# Patient Record
Sex: MALE | Race: WHITE | URBAN - METROPOLITAN AREA
[De-identification: names, ages, dates, MRNs, and addresses within clinical notes are randomized per-mention and may not be internally consistent; named-entity substitution may affect disease eponyms.]

---

## 2017-01-23 ENCOUNTER — EMERGENCY (EMERGENCY)
Facility: HOSPITAL | Age: 82
LOS: 0 days | Discharge: NURSING, REHAB OR LONG-TERM CARE | End: 2017-01-24
Admitting: EMERGENCY MEDICINE
Payer: MEDICARE

## 2017-01-23 DIAGNOSIS — R73.9 HYPERGLYCEMIA, UNSPECIFIED: ICD-10-CM

## 2017-01-23 DIAGNOSIS — R50.9 FEVER, UNSPECIFIED: ICD-10-CM

## 2017-01-23 DIAGNOSIS — Z79.4 LONG TERM (CURRENT) USE OF INSULIN: ICD-10-CM

## 2017-01-23 DIAGNOSIS — B34.9 VIRAL INFECTION, UNSPECIFIED: ICD-10-CM

## 2017-01-23 DIAGNOSIS — Z79.899 OTHER LONG TERM (CURRENT) DRUG THERAPY: ICD-10-CM

## 2017-01-23 PROCEDURE — 71010: CPT | Mod: 26

## 2017-01-23 PROCEDURE — 99285 EMERGENCY DEPT VISIT HI MDM: CPT

## 2017-01-23 PROCEDURE — 93010 ELECTROCARDIOGRAM REPORT: CPT

## 2017-01-29 LAB
CULTURE RESULTS: SIGNIFICANT CHANGE UP
CULTURE RESULTS: SIGNIFICANT CHANGE UP
SPECIMEN SOURCE: SIGNIFICANT CHANGE UP
SPECIMEN SOURCE: SIGNIFICANT CHANGE UP

## 2018-01-07 ENCOUNTER — INPATIENT (INPATIENT)
Facility: HOSPITAL | Age: 83
LOS: 1 days | Discharge: HOME | End: 2018-01-09
Admitting: INTERNAL MEDICINE
Payer: MEDICARE

## 2018-01-07 VITALS
SYSTOLIC BLOOD PRESSURE: 142 MMHG | WEIGHT: 154.98 LBS | HEART RATE: 63 BPM | RESPIRATION RATE: 18 BRPM | DIASTOLIC BLOOD PRESSURE: 77 MMHG | HEIGHT: 66 IN | OXYGEN SATURATION: 95 % | TEMPERATURE: 98 F

## 2018-01-07 DIAGNOSIS — Z90.89 ACQUIRED ABSENCE OF OTHER ORGANS: Chronic | ICD-10-CM

## 2018-01-07 LAB
ALBUMIN SERPL ELPH-MCNC: 3.7 G/DL — SIGNIFICANT CHANGE UP (ref 3.3–5)
ALP SERPL-CCNC: 90 U/L — SIGNIFICANT CHANGE UP (ref 40–120)
ALT FLD-CCNC: 19 U/L — SIGNIFICANT CHANGE UP (ref 12–78)
ANION GAP SERPL CALC-SCNC: 6 MMOL/L — SIGNIFICANT CHANGE UP (ref 5–17)
APTT BLD: 33.1 SEC — SIGNIFICANT CHANGE UP (ref 27.5–37.4)
AST SERPL-CCNC: 16 U/L — SIGNIFICANT CHANGE UP (ref 15–37)
BASOPHILS # BLD AUTO: 0.1 K/UL — SIGNIFICANT CHANGE UP (ref 0–0.2)
BASOPHILS NFR BLD AUTO: 1 % — SIGNIFICANT CHANGE UP (ref 0–2)
BILIRUB SERPL-MCNC: 0.6 MG/DL — SIGNIFICANT CHANGE UP (ref 0.2–1.2)
BUN SERPL-MCNC: 29 MG/DL — HIGH (ref 7–23)
CALCIUM SERPL-MCNC: 9 MG/DL — SIGNIFICANT CHANGE UP (ref 8.5–10.1)
CHLORIDE SERPL-SCNC: 108 MMOL/L — SIGNIFICANT CHANGE UP (ref 96–108)
CO2 SERPL-SCNC: 29 MMOL/L — SIGNIFICANT CHANGE UP (ref 22–31)
CREAT SERPL-MCNC: 1.22 MG/DL — SIGNIFICANT CHANGE UP (ref 0.5–1.3)
EOSINOPHIL # BLD AUTO: 0.3 K/UL — SIGNIFICANT CHANGE UP (ref 0–0.5)
EOSINOPHIL NFR BLD AUTO: 4.6 % — SIGNIFICANT CHANGE UP (ref 0–6)
GLUCOSE SERPL-MCNC: 155 MG/DL — HIGH (ref 70–99)
HCT VFR BLD CALC: 46.8 % — SIGNIFICANT CHANGE UP (ref 39–50)
HGB BLD-MCNC: 15.3 G/DL — SIGNIFICANT CHANGE UP (ref 13–17)
INR BLD: 1.05 RATIO — SIGNIFICANT CHANGE UP (ref 0.88–1.16)
LYMPHOCYTES # BLD AUTO: 1 K/UL — SIGNIFICANT CHANGE UP (ref 1–3.3)
LYMPHOCYTES # BLD AUTO: 14.6 % — SIGNIFICANT CHANGE UP (ref 13–44)
MCHC RBC-ENTMCNC: 29.3 PG — SIGNIFICANT CHANGE UP (ref 27–34)
MCHC RBC-ENTMCNC: 32.8 GM/DL — SIGNIFICANT CHANGE UP (ref 32–36)
MCV RBC AUTO: 89.4 FL — SIGNIFICANT CHANGE UP (ref 80–100)
MONOCYTES # BLD AUTO: 0.6 K/UL — SIGNIFICANT CHANGE UP (ref 0–0.9)
MONOCYTES NFR BLD AUTO: 9.5 % — SIGNIFICANT CHANGE UP (ref 2–14)
NEUTROPHILS # BLD AUTO: 4.7 K/UL — SIGNIFICANT CHANGE UP (ref 1.8–7.4)
NEUTROPHILS NFR BLD AUTO: 70.3 % — SIGNIFICANT CHANGE UP (ref 43–77)
PLATELET # BLD AUTO: 151 K/UL — SIGNIFICANT CHANGE UP (ref 150–400)
POTASSIUM SERPL-MCNC: 4.1 MMOL/L — SIGNIFICANT CHANGE UP (ref 3.5–5.3)
POTASSIUM SERPL-SCNC: 4.1 MMOL/L — SIGNIFICANT CHANGE UP (ref 3.5–5.3)
PROT SERPL-MCNC: 7.1 GM/DL — SIGNIFICANT CHANGE UP (ref 6–8.3)
PROTHROM AB SERPL-ACNC: 11.4 SEC — SIGNIFICANT CHANGE UP (ref 9.8–12.7)
RBC # BLD: 5.23 M/UL — SIGNIFICANT CHANGE UP (ref 4.2–5.8)
RBC # FLD: 12.6 % — SIGNIFICANT CHANGE UP (ref 10.3–14.5)
SODIUM SERPL-SCNC: 143 MMOL/L — SIGNIFICANT CHANGE UP (ref 135–145)
TROPONIN I SERPL-MCNC: <0.015 NG/ML — SIGNIFICANT CHANGE UP (ref 0.01–0.04)
WBC # BLD: 6.7 K/UL — SIGNIFICANT CHANGE UP (ref 3.8–10.5)
WBC # FLD AUTO: 6.7 K/UL — SIGNIFICANT CHANGE UP (ref 3.8–10.5)

## 2018-01-07 PROCEDURE — 99285 EMERGENCY DEPT VISIT HI MDM: CPT

## 2018-01-07 PROCEDURE — 72125 CT NECK SPINE W/O DYE: CPT | Mod: 26

## 2018-01-07 PROCEDURE — 71045 X-RAY EXAM CHEST 1 VIEW: CPT | Mod: 26

## 2018-01-07 PROCEDURE — 70450 CT HEAD/BRAIN W/O DYE: CPT | Mod: 26

## 2018-01-07 RX ORDER — CITALOPRAM 10 MG/1
10 TABLET, FILM COATED ORAL DAILY
Qty: 0 | Refills: 0 | Status: DISCONTINUED | OUTPATIENT
Start: 2018-01-08 | End: 2018-01-09

## 2018-01-07 RX ORDER — ASPIRIN/CALCIUM CARB/MAGNESIUM 324 MG
81 TABLET ORAL DAILY
Qty: 0 | Refills: 0 | Status: DISCONTINUED | OUTPATIENT
Start: 2018-01-08 | End: 2018-01-09

## 2018-01-07 RX ORDER — DOCUSATE SODIUM 100 MG
100 CAPSULE ORAL THREE TIMES A DAY
Qty: 0 | Refills: 0 | Status: DISCONTINUED | OUTPATIENT
Start: 2018-01-07 | End: 2018-01-09

## 2018-01-07 RX ORDER — ERGOCALCIFEROL 1.25 MG/1
1 CAPSULE ORAL
Qty: 0 | Refills: 0 | COMMUNITY

## 2018-01-07 RX ORDER — PANTOPRAZOLE SODIUM 20 MG/1
40 TABLET, DELAYED RELEASE ORAL
Qty: 0 | Refills: 0 | Status: DISCONTINUED | OUTPATIENT
Start: 2018-01-08 | End: 2018-01-09

## 2018-01-07 RX ORDER — HYDRALAZINE HCL 50 MG
25 TABLET ORAL
Qty: 0 | Refills: 0 | Status: DISCONTINUED | OUTPATIENT
Start: 2018-01-07 | End: 2018-01-09

## 2018-01-07 RX ORDER — HEPARIN SODIUM 5000 [USP'U]/ML
5000 INJECTION INTRAVENOUS; SUBCUTANEOUS EVERY 12 HOURS
Qty: 0 | Refills: 0 | Status: DISCONTINUED | OUTPATIENT
Start: 2018-01-07 | End: 2018-01-09

## 2018-01-07 RX ORDER — ACETAMINOPHEN 500 MG
650 TABLET ORAL EVERY 6 HOURS
Qty: 0 | Refills: 0 | Status: DISCONTINUED | OUTPATIENT
Start: 2018-01-07 | End: 2018-01-09

## 2018-01-07 RX ORDER — INSULIN GLARGINE 100 [IU]/ML
0 INJECTION, SOLUTION SUBCUTANEOUS
Qty: 0 | Refills: 0 | COMMUNITY

## 2018-01-07 RX ORDER — DOCUSATE SODIUM 100 MG
1 CAPSULE ORAL
Qty: 0 | Refills: 0 | COMMUNITY

## 2018-01-07 RX ORDER — SENNA PLUS 8.6 MG/1
2 TABLET ORAL AT BEDTIME
Qty: 0 | Refills: 0 | Status: DISCONTINUED | OUTPATIENT
Start: 2018-01-07 | End: 2018-01-09

## 2018-01-07 RX ORDER — PREGABALIN 225 MG/1
500 CAPSULE ORAL DAILY
Qty: 0 | Refills: 0 | Status: DISCONTINUED | OUTPATIENT
Start: 2018-01-08 | End: 2018-01-09

## 2018-01-07 RX ORDER — SIMVASTATIN 20 MG/1
10 TABLET, FILM COATED ORAL AT BEDTIME
Qty: 0 | Refills: 0 | Status: DISCONTINUED | OUTPATIENT
Start: 2018-01-07 | End: 2018-01-09

## 2018-01-07 RX ORDER — CHOLECALCIFEROL (VITAMIN D3) 125 MCG
1 CAPSULE ORAL
Qty: 0 | Refills: 0 | COMMUNITY

## 2018-01-07 RX ORDER — ONDANSETRON 8 MG/1
4 TABLET, FILM COATED ORAL EVERY 6 HOURS
Qty: 0 | Refills: 0 | Status: DISCONTINUED | OUTPATIENT
Start: 2018-01-07 | End: 2018-01-09

## 2018-01-07 NOTE — H&P ADULT - NSHPPHYSICALEXAM_GEN_ALL_CORE
Vital Signs Last 24 Hrs  T(C): 36.5 (07 Jan 2018 11:51), Max: 36.5 (07 Jan 2018 11:51)  T(F): 97.7 (07 Jan 2018 11:51), Max: 97.7 (07 Jan 2018 11:51)  HR: 63 (07 Jan 2018 11:51) (63 - 63)  BP: 142/77 (07 Jan 2018 11:51) (142/77 - 142/77)  RR: 18 (07 Jan 2018 11:51) (18 - 18)  SpO2: 95% (07 Jan 2018 11:51) (95% - 95%)

## 2018-01-07 NOTE — H&P ADULT - HISTORY OF PRESENT ILLNESS
89 y/o M PMHx significant for IDDM, HTN, hyperlipidemia, depression, dementia, and CKD4 who was BIBA from [facility] for   further evaluation of an unwitnessed fall w/ (+)head trauma and unknown LOC. The patient presented to the ED as a trauma   notification. In the ED the patient was found to have a (+)head laceration w/ hematoma to the left forehead. CT Head did not reveal any acute pathology to note.  The patient denies any associated prodrome of chest pain, palpitations, shortness of breath or dizziness.

## 2018-01-07 NOTE — H&P ADULT - PMH
Dementia  Alzheimer's  Diabetes  Insulin dependent  HTN (hypertension)    Hyperlipidemia CKD (chronic kidney disease), stage IV    Dementia  Alzheimer's  Depression    Diabetes  Insulin dependent  HTN (hypertension)    Hyperlipidemia

## 2018-01-07 NOTE — ED PROVIDER NOTE - OBJECTIVE STATEMENT
Patient is a 89 yo male with pmh HTN, HLD, DM, dementia, on baby aspirin presenting from [facility] s/p unwitnessed fall just prior to arrival.  Has abrasion to forehead.  States he got up from bed and fell forward, is unsure why or how.  Denies precipitating symptoms such as chest pain, sob, or dizziness.  No musculoskeletal pain. Patient is a 89 yo male with pmh HTN, HLD, DM, dementia, on baby aspirin presenting from [facility] s/p unwitnessed fall just prior to arrival.  Has abrasion to forehead.  States he got up from bed and fell forward, is unsure why or how.  Patient found to be on ground by staff at facility- helped up; no prolonged period on the ground; Denies precipitating symptoms such as chest pain, sob, or dizziness.  No musculoskeletal pain. Offers no complaints at this time.

## 2018-01-07 NOTE — H&P ADULT - ASSESSMENT
89 y/o M PMHx significant for IDDM, HTN, hyperlipidemia, depression, dementia, and CKD4 who was BIBA from [facility] for   further evaluation of an unwitnessed fall w/ (+)head trauma and unknown LOC. The patient presented to the ED as a trauma   notification. In the ED the patient was found to have a (+)head laceration w/ hematoma to the left forehead. CT Head did not reveal   any acute pathology to note.  The patient denies any associated prodrome of chest pain, palpitations, shortness of breath or   dizziness. 89 y/o M PMHx significant for IDDM, HTN, hyperlipidemia, depression, dementia, and CKD4 who was BIBA from [facility] for   further evaluation of an unwitnessed fall w/ (+)head trauma and unknown LOC. The patient presented to the ED as a trauma   notification. In the ED the patient was found to have a (+)head laceration w/ hematoma to the left forehead. CT Head did not reveal any acute pathology to note.  The patient denies any associated prodrome of chest pain, palpitations, shortness of breath or dizziness.    1)Falls in the Elderly (unwitnessed); Syncope vs. Pre-syncope   ~admit to Telemetry  ~fall precautions  ~f/u serial Cain/EKGs  ~PT eval    2)HTN  ~cont. Hydralazine 25mg po bid    3)Hyperlipidemia  ~cont. Simvastatin 20mg po qHS    4)Depression  ~cont. Citalopram 10mg po daily    5)IDDM  ~FS qAC  ~cont. Basal/Bolus insulin regimen.    6)Vte ppx  ~cont. Heparin sq    Note: case discussed extensively with the patient at the bedside and his HCP (Caroline Smith [phone]) concerning this admission.

## 2018-01-07 NOTE — ED PROVIDER NOTE - CONSTITUTIONAL, MLM
normal... Well appearing, well nourished, awake, alert and oriented x2, and in no apparent distress.

## 2018-01-07 NOTE — ED ADULT TRIAGE NOTE - CHIEF COMPLAINT QUOTE
Pt BIBA from [facility] for unwitnessed fall. Pt on asa. Left sided hematoma noted to head. Pt awake and alert.

## 2018-01-07 NOTE — ED PROVIDER NOTE - MEDICAL DECISION MAKING DETAILS
89 yo male s/p unwitnessed fall with head injury.  Plan labs, imaging r/o trauma/ICH. 89 yo male s/p unwitnessed fall with head injury.  Plan labs, troponin, imaging r/o trauma/ICH. 89 yo male s/p unwitnessed fall with head injury; patient with dementia- unsure circumstances of fall; CT head/c-spine r/o traumatic etiology; EKG/CXR; labs; UA; re-eval closely

## 2018-01-07 NOTE — ED ADULT NURSE NOTE - OBJECTIVE STATEMENT
Unwitnessed fall at [facility].  Pt confused at baseline (hx Alzheimer's), without complaints on arrival.  Abrasion to L side of head.  VS and cardiac monitoring initiated.  Trauma alert initiated on arrival.

## 2018-01-08 LAB
ALBUMIN SERPL ELPH-MCNC: 3.4 G/DL — SIGNIFICANT CHANGE UP (ref 3.3–5)
ALP SERPL-CCNC: 86 U/L — SIGNIFICANT CHANGE UP (ref 40–120)
ALT FLD-CCNC: 14 U/L — SIGNIFICANT CHANGE UP (ref 12–78)
ANION GAP SERPL CALC-SCNC: 7 MMOL/L — SIGNIFICANT CHANGE UP (ref 5–17)
AST SERPL-CCNC: 17 U/L — SIGNIFICANT CHANGE UP (ref 15–37)
BASOPHILS # BLD AUTO: 0.1 K/UL — SIGNIFICANT CHANGE UP (ref 0–0.2)
BASOPHILS NFR BLD AUTO: 1.2 % — SIGNIFICANT CHANGE UP (ref 0–2)
BILIRUB SERPL-MCNC: 0.7 MG/DL — SIGNIFICANT CHANGE UP (ref 0.2–1.2)
BUN SERPL-MCNC: 24 MG/DL — HIGH (ref 7–23)
CALCIUM SERPL-MCNC: 8.9 MG/DL — SIGNIFICANT CHANGE UP (ref 8.5–10.1)
CHLORIDE SERPL-SCNC: 109 MMOL/L — HIGH (ref 96–108)
CO2 SERPL-SCNC: 27 MMOL/L — SIGNIFICANT CHANGE UP (ref 22–31)
CREAT SERPL-MCNC: 1.05 MG/DL — SIGNIFICANT CHANGE UP (ref 0.5–1.3)
EOSINOPHIL # BLD AUTO: 0.3 K/UL — SIGNIFICANT CHANGE UP (ref 0–0.5)
EOSINOPHIL NFR BLD AUTO: 4.4 % — SIGNIFICANT CHANGE UP (ref 0–6)
GLUCOSE BLDC GLUCOMTR-MCNC: 195 MG/DL — HIGH (ref 70–99)
GLUCOSE BLDC GLUCOMTR-MCNC: 204 MG/DL — HIGH (ref 70–99)
GLUCOSE BLDC GLUCOMTR-MCNC: 300 MG/DL — HIGH (ref 70–99)
GLUCOSE BLDC GLUCOMTR-MCNC: 88 MG/DL — SIGNIFICANT CHANGE UP (ref 70–99)
GLUCOSE SERPL-MCNC: 86 MG/DL — SIGNIFICANT CHANGE UP (ref 70–99)
HBA1C BLD-MCNC: 7.6 % — HIGH (ref 4–5.6)
HCT VFR BLD CALC: 45.5 % — SIGNIFICANT CHANGE UP (ref 39–50)
HGB BLD-MCNC: 15.1 G/DL — SIGNIFICANT CHANGE UP (ref 13–17)
LYMPHOCYTES # BLD AUTO: 1 K/UL — SIGNIFICANT CHANGE UP (ref 1–3.3)
LYMPHOCYTES # BLD AUTO: 13.1 % — SIGNIFICANT CHANGE UP (ref 13–44)
MAGNESIUM SERPL-MCNC: 2 MG/DL — SIGNIFICANT CHANGE UP (ref 1.6–2.6)
MCHC RBC-ENTMCNC: 29.4 PG — SIGNIFICANT CHANGE UP (ref 27–34)
MCHC RBC-ENTMCNC: 33.2 GM/DL — SIGNIFICANT CHANGE UP (ref 32–36)
MCV RBC AUTO: 88.6 FL — SIGNIFICANT CHANGE UP (ref 80–100)
MONOCYTES # BLD AUTO: 0.8 K/UL — SIGNIFICANT CHANGE UP (ref 0–0.9)
MONOCYTES NFR BLD AUTO: 10.2 % — SIGNIFICANT CHANGE UP (ref 2–14)
NEUTROPHILS # BLD AUTO: 5.6 K/UL — SIGNIFICANT CHANGE UP (ref 1.8–7.4)
NEUTROPHILS NFR BLD AUTO: 71.1 % — SIGNIFICANT CHANGE UP (ref 43–77)
PLATELET # BLD AUTO: 160 K/UL — SIGNIFICANT CHANGE UP (ref 150–400)
POTASSIUM SERPL-MCNC: 3.5 MMOL/L — SIGNIFICANT CHANGE UP (ref 3.5–5.3)
POTASSIUM SERPL-SCNC: 3.5 MMOL/L — SIGNIFICANT CHANGE UP (ref 3.5–5.3)
PROT SERPL-MCNC: 6.6 GM/DL — SIGNIFICANT CHANGE UP (ref 6–8.3)
RBC # BLD: 5.14 M/UL — SIGNIFICANT CHANGE UP (ref 4.2–5.8)
RBC # FLD: 12.3 % — SIGNIFICANT CHANGE UP (ref 10.3–14.5)
SODIUM SERPL-SCNC: 143 MMOL/L — SIGNIFICANT CHANGE UP (ref 135–145)
TROPONIN I SERPL-MCNC: <0.015 NG/ML — SIGNIFICANT CHANGE UP (ref 0.01–0.04)
WBC # BLD: 7.8 K/UL — SIGNIFICANT CHANGE UP (ref 3.8–10.5)
WBC # FLD AUTO: 7.8 K/UL — SIGNIFICANT CHANGE UP (ref 3.8–10.5)

## 2018-01-08 RX ORDER — HALOPERIDOL DECANOATE 100 MG/ML
0.5 INJECTION INTRAMUSCULAR
Qty: 0 | Refills: 0 | Status: DISCONTINUED | OUTPATIENT
Start: 2018-01-08 | End: 2018-01-09

## 2018-01-08 RX ORDER — DEXTROSE 50 % IN WATER 50 %
25 SYRINGE (ML) INTRAVENOUS ONCE
Qty: 0 | Refills: 0 | Status: DISCONTINUED | OUTPATIENT
Start: 2018-01-08 | End: 2018-01-09

## 2018-01-08 RX ORDER — SODIUM CHLORIDE 9 MG/ML
1000 INJECTION, SOLUTION INTRAVENOUS
Qty: 0 | Refills: 0 | Status: DISCONTINUED | OUTPATIENT
Start: 2018-01-08 | End: 2018-01-09

## 2018-01-08 RX ORDER — DEXTROSE 50 % IN WATER 50 %
1 SYRINGE (ML) INTRAVENOUS ONCE
Qty: 0 | Refills: 0 | Status: DISCONTINUED | OUTPATIENT
Start: 2018-01-08 | End: 2018-01-09

## 2018-01-08 RX ORDER — GLUCAGON INJECTION, SOLUTION 0.5 MG/.1ML
1 INJECTION, SOLUTION SUBCUTANEOUS ONCE
Qty: 0 | Refills: 0 | Status: DISCONTINUED | OUTPATIENT
Start: 2018-01-08 | End: 2018-01-09

## 2018-01-08 RX ORDER — INSULIN LISPRO 100/ML
VIAL (ML) SUBCUTANEOUS
Qty: 0 | Refills: 0 | Status: DISCONTINUED | OUTPATIENT
Start: 2018-01-08 | End: 2018-01-09

## 2018-01-08 RX ORDER — INSULIN GLARGINE 100 [IU]/ML
12 INJECTION, SOLUTION SUBCUTANEOUS AT BEDTIME
Qty: 0 | Refills: 0 | Status: DISCONTINUED | OUTPATIENT
Start: 2018-01-08 | End: 2018-01-09

## 2018-01-08 RX ORDER — DEXTROSE 50 % IN WATER 50 %
12.5 SYRINGE (ML) INTRAVENOUS ONCE
Qty: 0 | Refills: 0 | Status: DISCONTINUED | OUTPATIENT
Start: 2018-01-08 | End: 2018-01-09

## 2018-01-08 RX ORDER — INSULIN GLARGINE 100 [IU]/ML
8 INJECTION, SOLUTION SUBCUTANEOUS EVERY MORNING
Qty: 0 | Refills: 0 | Status: DISCONTINUED | OUTPATIENT
Start: 2018-01-08 | End: 2018-01-08

## 2018-01-08 RX ADMIN — INSULIN GLARGINE 12 UNIT(S): 100 INJECTION, SOLUTION SUBCUTANEOUS at 23:23

## 2018-01-08 RX ADMIN — HEPARIN SODIUM 5000 UNIT(S): 5000 INJECTION INTRAVENOUS; SUBCUTANEOUS at 18:19

## 2018-01-08 RX ADMIN — Medication 25 MILLIGRAM(S): at 06:23

## 2018-01-08 RX ADMIN — PREGABALIN 500 MICROGRAM(S): 225 CAPSULE ORAL at 12:17

## 2018-01-08 RX ADMIN — SIMVASTATIN 10 MILLIGRAM(S): 20 TABLET, FILM COATED ORAL at 23:16

## 2018-01-08 RX ADMIN — PANTOPRAZOLE SODIUM 40 MILLIGRAM(S): 20 TABLET, DELAYED RELEASE ORAL at 06:23

## 2018-01-08 RX ADMIN — HEPARIN SODIUM 5000 UNIT(S): 5000 INJECTION INTRAVENOUS; SUBCUTANEOUS at 06:22

## 2018-01-08 RX ADMIN — Medication 1 TABLET(S): at 12:17

## 2018-01-08 RX ADMIN — Medication 81 MILLIGRAM(S): at 12:18

## 2018-01-08 RX ADMIN — Medication 3: at 18:27

## 2018-01-08 RX ADMIN — CITALOPRAM 10 MILLIGRAM(S): 10 TABLET, FILM COATED ORAL at 12:18

## 2018-01-08 RX ADMIN — Medication 1: at 12:18

## 2018-01-08 RX ADMIN — Medication 25 MILLIGRAM(S): at 18:19

## 2018-01-08 NOTE — PROGRESS NOTE ADULT - SUBJECTIVE AND OBJECTIVE BOX
89 y/o M PMHx significant for IDDM, HTN, hyperlipidemia, depression, dementia, and CKD4 who was BIBA from [facility] for   further evaluation of an unwitnessed fall w/ (+)head trauma and unknown LOC. The patient presented to the ED as a trauma   notification. In the ED the patient was found to have a (+)head laceration w/ hematoma to the left forehead. CT Head did not reveal any acute pathology to note.  The patient denies any associated prodrome of chest pain, palpitations, shortness of breath or dizziness.      1/8/18 - Pt. seen and examined, confused. NAD.           Physical Exam:   Vital Signs Last 24 Hrs T(C): 36.2 (08 Jan 2018 09:36), Max: 36.5 (07 Jan 2018 11:51) T(F): 97.2 (08 Jan 2018 09:36), Max: 97.7 (07 Jan 2018 11:51) HR: 66 (08 Jan 2018 09:36) (55 - 66) BP: 129/52 (08 Jan 2018 09:36) (127/91 - 161/62) BP(mean): -- RR: 17 (08 Jan 2018 09:36) (16 - 18) SpO2: 98% (08 Jan 2018 09:36) (95% - 98%)    PHYSICAL EXAM:  GEN: lying in bed, NAD HEENT:   NC/AT CV:  +S1, +S2, RRR RESP:  CTA B/L GI:  abdomen soft, non-tender, non-distended, normoactive BS MSK:   normal muscle tone EXT:  no edema NEURO:  alert, confused SKIN:  no rashes  	      Laboratory:   All labs reviewed.                          15.1   7.8   )-----------( 160      ( 08 Jan 2018 06:05 )             45.5         01-08    143  |  109<H>  |  24<H>  ----------------------------<  86  3.5   |  27  |  1.05    Ca    8.9      08 Jan 2018 06:05  Mg     2.0     01-08    TPro  6.6  /  Alb  3.4  /  TBili  0.7  /  DBili  x   /  AST  17  /  ALT  14  /  AlkPhos  86  01-08        PT/INR - ( 07 Jan 2018 11:58 )   PT: 11.4 sec;   INR: 1.05 ratio         PTT - ( 07 Jan 2018 11:58 )  PTT:33.1 sec            MEDICATIONS  (STANDING):  aspirin enteric coated 81 milliGRAM(s) Oral daily  citalopram 10 milliGRAM(s) Oral daily  cyanocobalamin 500 MICROGram(s) Oral daily  dextrose 5%. 1000 milliLiter(s) (50 mL/Hr) IV Continuous <Continuous>  dextrose 50% Injectable 12.5 Gram(s) IV Push once  dextrose 50% Injectable 25 Gram(s) IV Push once  dextrose 50% Injectable 25 Gram(s) IV Push once  heparin  Injectable 5000 Unit(s) SubCutaneous every 12 hours  hydrALAZINE 25 milliGRAM(s) Oral two times a day  insulin glargine Injectable (LANTUS) 8 Unit(s) SubCutaneous every morning  insulin lispro (HumaLOG) corrective regimen sliding scale   SubCutaneous three times a day before meals  multivitamin 1 Tablet(s) Oral daily  pantoprazole    Tablet 40 milliGRAM(s) Oral before breakfast  simvastatin 10 milliGRAM(s) Oral at bedtime    MEDICATIONS  (PRN):  acetaminophen   Tablet 650 milliGRAM(s) Oral every 6 hours PRN For Temp greater than 38 C (100.4 F)  acetaminophen   Tablet. 650 milliGRAM(s) Oral every 6 hours PRN Mild Pain (1 - 3)  dextrose Gel 1 Dose(s) Oral once PRN Blood Glucose LESS THAN 70 milliGRAM(s)/deciliter  docusate sodium 100 milliGRAM(s) Oral three times a day PRN Constipation  glucagon  Injectable 1 milliGRAM(s) IntraMuscular once PRN Glucose LESS THAN 70 milligrams/deciliter  ondansetron Injectable 4 milliGRAM(s) IV Push every 6 hours PRN Nausea  senna 2 Tablet(s) Oral at bedtime PRN Constipation              Assessment and Plan:     89 y/o M PMHx as above admitted with:      # Fall  - r/o syncope, monitor on tele  - fall precautions  - PT consult  - trop neg x 2  - check orthostatics    # HTN  - cont. Hydralazine 25mg po bid    # Hyperlipidemia  - cont. Simvastatin 10mg po qHS    # Depression  - cont. Citalopram 10mg po daily    # IDDM  - FS qAC  - c/w Lantus 8 units SQ, (patient takes 20 units at home- ?causing hypoglycemia and increase risk of falls)  - HgbA1c 7.6      # Vte ppx  - cont. Heparin sq    # Dispo plan  - monitor sugars, check PT eval  - case discussed extensively with HCP (Caroline Smith [phone]) concerning this admission 89 y/o M PMHx significant for IDDM, HTN, hyperlipidemia, depression, dementia, and CKD4 who was BIBA from [facility] for   further evaluation of an unwitnessed fall w/ (+)head trauma and unknown LOC. The patient presented to the ED as a trauma   notification. In the ED the patient was found to have a (+)head laceration w/ hematoma to the left forehead. CT Head did not reveal any acute pathology to note.  The patient denies any associated prodrome of chest pain, palpitations, shortness of breath or dizziness.      1/8/18 - Pt. seen and examined, confused. NAD.           Physical Exam:   Vital Signs Last 24 Hrs T(C): 36.2 (08 Jan 2018 09:36), Max: 36.5 (07 Jan 2018 11:51) T(F): 97.2 (08 Jan 2018 09:36), Max: 97.7 (07 Jan 2018 11:51) HR: 66 (08 Jan 2018 09:36) (55 - 66) BP: 129/52 (08 Jan 2018 09:36) (127/91 - 161/62) BP(mean): -- RR: 17 (08 Jan 2018 09:36) (16 - 18) SpO2: 98% (08 Jan 2018 09:36) (95% - 98%)    PHYSICAL EXAM:  GEN: lying in bed, NAD HEENT:   NC/AT CV:  +S1, +S2, RRR RESP:  CTA B/L GI:  abdomen soft, non-tender, non-distended, normoactive BS MSK:   normal muscle tone EXT:  no edema NEURO:  alert, confused SKIN:  no rashes  	      Laboratory:   All labs reviewed.                          15.1   7.8   )-----------( 160      ( 08 Jan 2018 06:05 )             45.5         01-08    143  |  109<H>  |  24<H>  ----------------------------<  86  3.5   |  27  |  1.05    Ca    8.9      08 Jan 2018 06:05  Mg     2.0     01-08    TPro  6.6  /  Alb  3.4  /  TBili  0.7  /  DBili  x   /  AST  17  /  ALT  14  /  AlkPhos  86  01-08        PT/INR - ( 07 Jan 2018 11:58 )   PT: 11.4 sec;   INR: 1.05 ratio         PTT - ( 07 Jan 2018 11:58 )  PTT:33.1 sec            MEDICATIONS  (STANDING):  aspirin enteric coated 81 milliGRAM(s) Oral daily  citalopram 10 milliGRAM(s) Oral daily  cyanocobalamin 500 MICROGram(s) Oral daily  dextrose 5%. 1000 milliLiter(s) (50 mL/Hr) IV Continuous <Continuous>  dextrose 50% Injectable 12.5 Gram(s) IV Push once  dextrose 50% Injectable 25 Gram(s) IV Push once  dextrose 50% Injectable 25 Gram(s) IV Push once  heparin  Injectable 5000 Unit(s) SubCutaneous every 12 hours  hydrALAZINE 25 milliGRAM(s) Oral two times a day  insulin glargine Injectable (LANTUS) 8 Unit(s) SubCutaneous every morning  insulin lispro (HumaLOG) corrective regimen sliding scale   SubCutaneous three times a day before meals  multivitamin 1 Tablet(s) Oral daily  pantoprazole    Tablet 40 milliGRAM(s) Oral before breakfast  simvastatin 10 milliGRAM(s) Oral at bedtime    MEDICATIONS  (PRN):  acetaminophen   Tablet 650 milliGRAM(s) Oral every 6 hours PRN For Temp greater than 38 C (100.4 F)  acetaminophen   Tablet. 650 milliGRAM(s) Oral every 6 hours PRN Mild Pain (1 - 3)  dextrose Gel 1 Dose(s) Oral once PRN Blood Glucose LESS THAN 70 milliGRAM(s)/deciliter  docusate sodium 100 milliGRAM(s) Oral three times a day PRN Constipation  glucagon  Injectable 1 milliGRAM(s) IntraMuscular once PRN Glucose LESS THAN 70 milligrams/deciliter  ondansetron Injectable 4 milliGRAM(s) IV Push every 6 hours PRN Nausea  senna 2 Tablet(s) Oral at bedtime PRN Constipation              Assessment and Plan:     89 y/o M PMHx as above admitted with:      # Fall  - r/o syncope, monitor on tele  - fall precautions  - PT consult  - trop neg x 2  - check orthostatics  - CT scan head neg  - CT spine - IMPRESSION:   No vertebral fracture is recognized.   Multilevel degenerative changes of the cervical spine are present.    # HTN  - cont. Hydralazine 25mg po bid    # Hyperlipidemia  - cont. Simvastatin 10mg po qHS    # Depression  - cont. Citalopram 10mg po daily    # IDDM  - FS qAC  - c/w Lantus 8 units SQ, (patient takes 20 units at home- ?causing hypoglycemia and increase risk of falls)  - HgbA1c 7.6      # Vte ppx  - cont. Heparin sq    # Dispo plan  - monitor sugars, check PT eval  - case discussed extensively with HCP (Caroline Smith [phone]) concerning this admission CC: FALL     HPI: 89 y/o M PMHx significant for IDDM, HTN, hyperlipidemia, depression, dementia, and CKD4 who was BIBA from [facility] for   further evaluation of an unwitnessed fall w/ (+)head trauma and unknown LOC. The patient presented to the ED as a trauma   notification. In the ED the patient was found to have a (+)head laceration w/ hematoma to the left forehead. CT Head did not reveal any acute pathology to note.  The patient denies any associated prodrome of chest pain, palpitations, shortness of breath or dizziness.      1/8/18 - Pt. seen and examined, confused. NAD.           Physical Exam:   Vital Signs Last 24 Hrs T(C): 36.2 (08 Jan 2018 09:36), Max: 36.5 (07 Jan 2018 11:51) T(F): 97.2 (08 Jan 2018 09:36), Max: 97.7 (07 Jan 2018 11:51) HR: 66 (08 Jan 2018 09:36) (55 - 66) BP: 129/52 (08 Jan 2018 09:36) (127/91 - 161/62) BP(mean): -- RR: 17 (08 Jan 2018 09:36) (16 - 18) SpO2: 98% (08 Jan 2018 09:36) (95% - 98%)    PHYSICAL EXAM:  GEN: lying in bed, NAD HEENT:   NC/AT CV:  +S1, +S2, RRR RESP:  CTA B/L GI:  abdomen soft, non-tender, non-distended, normoactive BS MSK:   normal muscle tone EXT:  no edema NEURO:  alert, confused SKIN:  no rashes  	      Laboratory:   All labs reviewed.                          15.1   7.8   )-----------( 160      ( 08 Jan 2018 06:05 )             45.5         01-08    143  |  109<H>  |  24<H>  ----------------------------<  86  3.5   |  27  |  1.05    Ca    8.9      08 Jan 2018 06:05  Mg     2.0     01-08    TPro  6.6  /  Alb  3.4  /  TBili  0.7  /  DBili  x   /  AST  17  /  ALT  14  /  AlkPhos  86  01-08  PT/INR - ( 07 Jan 2018 11:58 )   PT: 11.4 sec;   INR: 1.05 ratio      PTT - ( 07 Jan 2018 11:58 )  PTT:33.1 sec      MEDICATIONS  (STANDING):  aspirin enteric coated 81 milliGRAM(s) Oral daily  citalopram 10 milliGRAM(s) Oral daily  cyanocobalamin 500 MICROGram(s) Oral daily  dextrose 5%. 1000 milliLiter(s) (50 mL/Hr) IV Continuous <Continuous>  dextrose 50% Injectable 12.5 Gram(s) IV Push once  dextrose 50% Injectable 25 Gram(s) IV Push once  dextrose 50% Injectable 25 Gram(s) IV Push once  heparin  Injectable 5000 Unit(s) SubCutaneous every 12 hours  hydrALAZINE 25 milliGRAM(s) Oral two times a day  insulin glargine Injectable (LANTUS) 8 Unit(s) SubCutaneous every morning  insulin lispro (HumaLOG) corrective regimen sliding scale   SubCutaneous three times a day before meals  multivitamin 1 Tablet(s) Oral daily  pantoprazole    Tablet 40 milliGRAM(s) Oral before breakfast  simvastatin 10 milliGRAM(s) Oral at bedtime    Assessment and Plan:     89 y/o M PMHx as above admitted with:      # Fall  - r/o syncope, monitor on tele  - fall precautions  - PT consult  - trop neg x 2  - check orthostatics  - CT scan head neg  - CT spine - IMPRESSION:   No vertebral fracture is recognized.   Multilevel degenerative changes of the cervical spine are present.    # HTN  - cont. Hydralazine 25mg po bid    # Hyperlipidemia  - cont. Simvastatin 10mg po qHS    # Depression  - cont. Citalopram 10mg po daily    # IDDM  - FS qAC  - c/w Lantus 8 units SQ, (patient takes 20 units at home- ?causing hypoglycemia and increase risk of falls)  - HgbA1c 7.6      # Vte ppx  - cont. Heparin sq    # Dispo plan  - monitor sugars, check PT eval  - case discussed extensively with HCP (Caroline Smith [phone]) concerning this admission

## 2018-01-08 NOTE — PROGRESS NOTE ADULT - ATTENDING COMMENTS
Pt. seen and examined with ADALI Conti. Agree with assessment and plan as above. Changes made where appropriate. Pt. seen and examined with ADALI Conti. Agree with assessment and plan as above. Changes made where appropriate.    Lengthy discussion with daughter Caroline (HCP) regarding patient. HE sustained a mechanical fall at SNF ([facility]), tripped over his shoes, no LOC.     Plan:   - no signs of infection, no acute bleeding, negative head CT and stable electrolytes.   - plan for PT eval and dc back to SNF tomorrow.

## 2018-01-09 ENCOUNTER — TRANSCRIPTION ENCOUNTER (OUTPATIENT)
Age: 83
End: 2018-01-09

## 2018-01-09 VITALS
HEART RATE: 61 BPM | DIASTOLIC BLOOD PRESSURE: 45 MMHG | OXYGEN SATURATION: 93 % | TEMPERATURE: 98 F | RESPIRATION RATE: 18 BRPM | SYSTOLIC BLOOD PRESSURE: 127 MMHG

## 2018-01-09 LAB
GLUCOSE BLDC GLUCOMTR-MCNC: 139 MG/DL — HIGH (ref 70–99)
GLUCOSE BLDC GLUCOMTR-MCNC: 207 MG/DL — HIGH (ref 70–99)

## 2018-01-09 RX ORDER — HALOPERIDOL DECANOATE 100 MG/ML
0.5 INJECTION INTRAMUSCULAR ONCE
Qty: 0 | Refills: 0 | Status: COMPLETED | OUTPATIENT
Start: 2018-01-09 | End: 2018-01-09

## 2018-01-09 RX ADMIN — Medication: at 12:19

## 2018-01-09 RX ADMIN — HEPARIN SODIUM 5000 UNIT(S): 5000 INJECTION INTRAVENOUS; SUBCUTANEOUS at 04:48

## 2018-01-09 RX ADMIN — Medication 81 MILLIGRAM(S): at 12:19

## 2018-01-09 RX ADMIN — PANTOPRAZOLE SODIUM 40 MILLIGRAM(S): 20 TABLET, DELAYED RELEASE ORAL at 04:48

## 2018-01-09 RX ADMIN — CITALOPRAM 10 MILLIGRAM(S): 10 TABLET, FILM COATED ORAL at 12:20

## 2018-01-09 RX ADMIN — Medication 1 TABLET(S): at 12:20

## 2018-01-09 RX ADMIN — HALOPERIDOL DECANOATE 0.5 MILLIGRAM(S): 100 INJECTION INTRAMUSCULAR at 04:47

## 2018-01-09 RX ADMIN — PREGABALIN 500 MICROGRAM(S): 225 CAPSULE ORAL at 12:20

## 2018-01-09 RX ADMIN — Medication 25 MILLIGRAM(S): at 04:48

## 2018-01-09 NOTE — PHYSICAL THERAPY INITIAL EVALUATION ADULT - GENERAL OBSERVATIONS, REHAB EVAL
Pt found sitting in recliner by nursing station with seat alarm and chair alarm attached. Pt with tele monitor. PAINAD: 0/10.

## 2018-01-09 NOTE — DISCHARGE NOTE ADULT - PLAN OF CARE
to return to [facility] - maintain safety Continue home medication regimen - HgbA1C = 7.6 - HgbA1C = 7.6, good diabetic control

## 2018-01-09 NOTE — DISCHARGE NOTE ADULT - CARE PLAN
Principal Discharge DX:	Fall  Goal:	to return to [facility]  Instructions for follow-up, activity and diet:	- maintain safety  Secondary Diagnosis:	Diabetes  Goal:	Continue home medication regimen  Instructions for follow-up, activity and diet:	- HgbA1C = 7.6 Principal Discharge DX:	Fall  Goal:	to return to [facility]  Instructions for follow-up, activity and diet:	- maintain safety  Secondary Diagnosis:	Diabetes  Goal:	Continue home medication regimen  Instructions for follow-up, activity and diet:	- HgbA1C = 7.6, good diabetic control

## 2018-01-09 NOTE — DISCHARGE NOTE ADULT - MEDICATION SUMMARY - MEDICATIONS TO TAKE
I will START or STAY ON the medications listed below when I get home from the hospital:    aspirin 81 mg oral tablet  -- 1 tab(s) by mouth once a day  -- Indication: For For your heart    acetaminophen 650 mg oral tablet  -- 1 tab(s) by mouth every 6 hours, As Needed  -- Indication: For tylenol as needed    citalopram 10 mg oral tablet  -- 1 tab(s) by mouth once a day  -- Indication: For antidepressant    HumuLIN R  -- Indication: For For diabetes    Lantus  -- 20 unit(s) subcutaneous once a day  -- Indication: For For diabetes    simvastatin 10 mg oral tablet  -- 1 tab(s) by mouth once a day (at bedtime)  -- Indication: For For your cholesterol    senna oral tablet  -- 1 tab(s) by mouth once a day  -- Indication: For Stool softner    docusate sodium 100 mg oral capsule  -- 1 cap(s) by mouth once a day (at bedtime)  -- Indication: For Stool softner    omeprazole 20 mg oral delayed release capsule  -- 1 cap(s) by mouth once a day  -- Indication: For For your stomach    hydrALAZINE 25 mg oral tablet  -- 1 tab(s) by mouth 2 times a day  -- Indication: For For your heart    Multiple Vitamins oral tablet  -- 1 tab(s) by mouth once a day  -- Indication: For vitamin    Vitamin D2 50,000 intl units (1.25 mg) oral capsule  -- 1 cap(s) by mouth once a week  -- Indication: For vitamin    Vitamin B12 500 mcg oral tablet  -- 1 tab(s) by mouth once a day  -- Indication: For vitamin

## 2018-01-09 NOTE — DISCHARGE NOTE ADULT - HOSPITAL COURSE
HPI from ED: 89 y/o M with PMHx significant for IDDM, HTN, hyperlipidemia, depression, dementia, and CKD4 who was BIBA from [facility] for further evaluation of an unwitnessed fall w/ (+)head trauma and unknown LOC. The patient presented to the ED as a trauma notification. In the ED the patient was found to have a (+)head laceration w/ hematoma to the left forehead. CT Head did not reveal any acute pathology to note.  T       Physical Exam:   Vital Signs Last 24 Hrs T(C): 36.7 (09 Jan 2018 05:02), Max: 37.2 (08 Jan 2018 17:06) T(F): 98 (09 Jan 2018 05:02), Max: 98.9 (08 Jan 2018 17:06) HR: 61 (09 Jan 2018 05:02) (61 - 71) BP: 127/45 (09 Jan 2018 05:02) (107/34 - 129/52) BP(mean): -- RR: 18 (09 Jan 2018 05:02) (17 - 18) SpO2: 93% (09 Jan 2018 05:02) (93% - 98%)    PHYSICAL EXAM:  GEN: sitting in the chair, NAD, confused at baseline HEENT:   NC/AT CV:  +S1, +S2, RRR RESP:  CTA B/L GI:  abdomen soft, non-tender, non-distended, normoactive BS MSK:   normal muscle tone EXT:  no edema NEURO:  alert, confused SKIN:  no rashes  	      Laboratory:   All labs reviewed.    HOSPITAL COURSE BY PROBLEM:      # Fall  - no events on tele  - fall precautions  - PT consult -  - trop neg x 2  - orthostatics negative  - CT scan head neg  - CT spine - IMPRESSION:   No vertebral fracture is recognized.   Multilevel degenerative changes of the cervical spine are present.    # HTN  - cont. Hydralazine 25mg po bid    # Hyperlipidemia  - cont. Simvastatin 10mg po qHS    # Depression  - cont. Citalopram 10mg po daily    # IDDM  - FS qAC  - continue pt home dose of Lantus 20 units on discharge (tried to decrease Lantus dose in hospital and sugars were elevated so will maintain home dose)  - HgbA1c 7.6      Discussed with HCP (Caroline Smith [phone]). CC: fall    HPI from ED: 89 y/o M with PMHx significant for IDDM, HTN, hyperlipidemia, depression, dementia, and CKD4 who was BIBA from [facility] for further evaluation of an unwitnessed fall w/ (+)head trauma and unknown LOC. The patient presented to the ED as a trauma notification. In the ED the patient was found to have a (+)head laceration w/ hematoma to the left forehead. CT Head did not reveal any acute pathology to note.      1/9/18: Seen sitting in gerichair in hallway, no complaints. Pleasantly confused. Afebrile.    ROS; neg unless stated above.        Physical Exam:   Vital Signs Last 24 Hrs T(C): 36.7 (09 Jan 2018 05:02), Max: 37.2 (08 Jan 2018 17:06) T(F): 98 (09 Jan 2018 05:02), Max: 98.9 (08 Jan 2018 17:06) HR: 61 (09 Jan 2018 05:02) (61 - 71) BP: 127/45 (09 Jan 2018 05:02) (107/34 - 129/52) BP(mean): -- RR: 18 (09 Jan 2018 05:02) (17 - 18) SpO2: 93% (09 Jan 2018 05:02) (93% - 98%)  PHYSICAL EXAM: GEN: sitting in the chair, NAD, confused at baseline HEENT:   NC/AT CV:  +S1, +S2, RRR RESP:  CTA B/L GI:  abdomen soft, non-tender, non-distended, normoactive BS MSK:   normal muscle tone EXT:  no edema NEURO:  alert, confused SKIN:  no rashes  	      Laboratory:   All labs reviewed.    HOSPITAL COURSE BY PROBLEM:    # Fall  - mechanical in nature, tripped over shoes per daughter Caroline.   - no events on tele  - fall precautions  - PT consult -  - trop neg x 2  - orthostatics negative  - CT scan head neg  - CT spine - IMPRESSION:   No vertebral fracture is recognized.   Multilevel degenerative changes of the cervical spine are present.    # HTN - stable BP  - cont. Hydralazine 25mg po bid    # Hyperlipidemia  - cont. Simvastatin 10mg po qHS    # Depression  - cont. Citalopram 10mg po daily    # IDDM  - FS qAC  - continue pt home dose of Lantus 20 units on discharge (tried to decrease Lantus dose in hospital and sugars were elevated so will maintain home dose)  - HgbA1c 7.6      Discussed with HCP (Caroline Smith [phone]).    DC today. Total time > 35 mins. CC: fall    HPI from ED: 89 y/o M with PMHx significant for IDDM, HTN, hyperlipidemia, depression, dementia, and CKD4 who was BIBA from [facility] for further evaluation of an unwitnessed fall w/ (+)head trauma and unknown LOC. The patient presented to the ED as a trauma notification. In the ED the patient was found to have a (+)head laceration w/ hematoma to the left forehead. CT Head did not reveal any acute pathology to note.      1/9/18: Seen sitting in gerichair in [place], no complaints. Pleasantly confused. Afebrile.    ROS; neg unless stated above.        Physical Exam:   Vital Signs Last 24 Hrs T(C): 36.7 (09 Jan 2018 05:02), Max: 37.2 (08 Jan 2018 17:06) T(F): 98 (09 Jan 2018 05:02), Max: 98.9 (08 Jan 2018 17:06) HR: 61 (09 Jan 2018 05:02) (61 - 71) BP: 127/45 (09 Jan 2018 05:02) (107/34 - 129/52) BP(mean): -- RR: 18 (09 Jan 2018 05:02) (17 - 18) SpO2: 93% (09 Jan 2018 05:02) (93% - 98%)  PHYSICAL EXAM: GEN: sitting in the chair, NAD, confused at baseline HEENT:   NC/AT CV:  +S1, +S2, RRR RESP:  CTA B/L GI:  abdomen soft, non-tender, non-distended, normoactive BS MSK:   normal muscle tone EXT:  no edema NEURO:  alert, confused SKIN:  no rashes  	      Laboratory:   All labs reviewed.    HOSPITAL COURSE BY PROBLEM:    # Fall  - mechanical in nature, tripped over shoes per daughter Caroline.   - no events on tele  - fall precautions  - PT consult apprecaited  - trop neg x 2  - orthostatics negative  - CT scan head neg  - CT spine - IMPRESSION:   No vertebral fracture is recognized.   Multilevel degenerative changes of the cervical spine are present.    # HTN - stable BP  - cont. Hydralazine 25mg po bid    # Hyperlipidemia  - cont. Simvastatin 10mg po qHS    # Depression  - cont. Citalopram 10mg po daily    # IDDM  - FS qAC  - continue pt home dose of Lantus 20 units on discharge (tried to decrease Lantus dose in hospital and sugars were elevated so will maintain home dose)  - HgbA1c 7.6      Discussed with HCP (Caroline Smith [phone]).    [facility] today to [facility] Total time > 35 mins. CC: fall    HPI from ED: 89 y/o M with PMHx significant for IDDM, HTN, hyperlipidemia, depression, dementia, and CKD4 who was BIBA from [facility] for further evaluation of an unwitnessed fall w/ (+)head trauma and unknown LOC. The patient presented to the ED as a trauma notification. In the ED the patient was found to have a (+)head laceration w/ hematoma to the left forehead. CT Head did not reveal any acute pathology to note.      1/9/18: Seen sitting in gerichair in [place], no complaints. Pleasantly confused. Afebrile.    ROS; neg unless stated above.        Physical Exam:   Vital Signs Last 24 Hrs T(C): 36.7 (09 Jan 2018 05:02), Max: 37.2 (08 Jan 2018 17:06) T(F): 98 (09 Jan 2018 05:02), Max: 98.9 (08 Jan 2018 17:06) HR: 61 (09 Jan 2018 05:02) (61 - 71) BP: 127/45 (09 Jan 2018 05:02) (107/34 - 129/52) BP(mean): -- RR: 18 (09 Jan 2018 05:02) (17 - 18) SpO2: 93% (09 Jan 2018 05:02) (93% - 98%)  PHYSICAL EXAM: GEN: sitting in the chair, NAD, confused at baseline HEENT:   NC/AT CV:  +S1, +S2, RRR RESP:  CTA B/L GI:  abdomen soft, non-tender, non-distended, normoactive BS MSK:   normal muscle tone EXT:  no edema NEURO:  alert, confused SKIN:  no rashes  	      Laboratory:   All labs reviewed.    HOSPITAL COURSE BY PROBLEM:    # Fall  - mechanical in nature, tripped over shoes per daughter Caroline.   - no events on tele  - fall precautions  - PT consult appreciated - okay to return to [facility]  - trop neg x 2  - orthostatics negative  - CT scan head neg  - CT spine - IMPRESSION:   No vertebral fracture is recognized.   Multilevel degenerative changes of the cervical spine are present.    # HTN - stable BP  - cont. Hydralazine 25mg po bid    # Hyperlipidemia  - cont. Simvastatin 10mg po qHS    # Depression  - cont. Citalopram 10mg po daily    # IDDM  - FS qAC  - continue pt home dose of Lantus 20 units on discharge (tried to decrease Lantus dose in hospital and sugars were elevated so will maintain home dose)  - HgbA1c 7.6      Discussed with HCP (Caroline Smith [phone]).    DC today to [facility] Total time > 35 mins.

## 2018-01-09 NOTE — DISCHARGE NOTE ADULT - PATIENT PORTAL LINK FT
“You can access the Follow[employer] Patient Portal, offered by [facility], by registering with the following website: [email or web address]”

## 2018-01-09 NOTE — PHYSICAL THERAPY INITIAL EVALUATION ADULT - PERTINENT HX OF CURRENT PROBLEM, REHAB EVAL
Pt admitted to  secondary to fall. CT c-spine: neg for acute findings. CT head: neg for acute findings.

## 2018-01-15 DIAGNOSIS — F02.80 DEMENTIA IN OTHER DISEASES CLASSIFIED ELSEWHERE, UNSPECIFIED SEVERITY, WITHOUT BEHAVIORAL DISTURBANCE, PSYCHOTIC DISTURBANCE, MOOD DISTURBANCE, AND ANXIETY: ICD-10-CM

## 2018-01-15 DIAGNOSIS — Z79.4 LONG TERM (CURRENT) USE OF INSULIN: ICD-10-CM

## 2018-01-15 DIAGNOSIS — R55 SYNCOPE AND COLLAPSE: ICD-10-CM

## 2018-01-15 DIAGNOSIS — S01.81XA LACERATION WITHOUT FOREIGN BODY OF OTHER PART OF HEAD, INITIAL ENCOUNTER: ICD-10-CM
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
## 2018-01-15 DIAGNOSIS — F32.9 MAJOR DEPRESSIVE DISORDER, SINGLE EPISODE, UNSPECIFIED: ICD-10-CM

## 2018-01-15 DIAGNOSIS — Z79.82 LONG TERM (CURRENT) USE OF ASPIRIN: ICD-10-CM

## 2018-01-15 DIAGNOSIS — I12.9 HYPERTENSIVE CHRONIC KIDNEY DISEASE WITH STAGE 1 THROUGH STAGE 4 CHRONIC KIDNEY DISEASE, OR UNSPECIFIED CHRONIC KIDNEY DISEASE: ICD-10-CM
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
## 2018-01-15 DIAGNOSIS — E11.22 TYPE 2 DIABETES MELLITUS WITH DIABETIC CHRONIC KIDNEY DISEASE: ICD-10-CM

## 2018-01-15 DIAGNOSIS — E78.5 HYPERLIPIDEMIA, UNSPECIFIED: ICD-10-CM
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
## 2018-01-15 DIAGNOSIS — G30.9 ALZHEIMER'S DISEASE, UNSPECIFIED: ICD-10-CM

## 2018-01-15 DIAGNOSIS — N18.4 CHRONIC KIDNEY DISEASE, STAGE 4 (SEVERE): ICD-10-CM
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
## 2019-02-15 ENCOUNTER — EMERGENCY (EMERGENCY)
Facility: HOSPITAL | Age: 84
LOS: 0 days | Discharge: HOME | End: 2019-02-15
Attending: EMERGENCY MEDICINE | Admitting: EMERGENCY MEDICINE
Payer: MEDICARE

## 2019-02-15 VITALS
OXYGEN SATURATION: 94 % | RESPIRATION RATE: 18 BRPM | DIASTOLIC BLOOD PRESSURE: 56 MMHG | SYSTOLIC BLOOD PRESSURE: 122 MMHG | HEART RATE: 75 BPM | TEMPERATURE: 98 F | HEIGHT: 64 IN | WEIGHT: 145.06 LBS

## 2019-02-15 VITALS
TEMPERATURE: 98 F | DIASTOLIC BLOOD PRESSURE: 74 MMHG | SYSTOLIC BLOOD PRESSURE: 149 MMHG | OXYGEN SATURATION: 94 % | HEART RATE: 60 BPM | RESPIRATION RATE: 18 BRPM

## 2019-02-15 DIAGNOSIS — Z90.89 ACQUIRED ABSENCE OF OTHER ORGANS: Chronic | ICD-10-CM

## 2019-02-15 DIAGNOSIS — Z79.899 OTHER LONG TERM (CURRENT) DRUG THERAPY: ICD-10-CM

## 2019-02-15 DIAGNOSIS — N18.9 CHRONIC KIDNEY DISEASE, UNSPECIFIED: ICD-10-CM

## 2019-02-15 DIAGNOSIS — E11.22 TYPE 2 DIABETES MELLITUS WITH DIABETIC CHRONIC KIDNEY DISEASE: ICD-10-CM

## 2019-02-15 DIAGNOSIS — E78.5 HYPERLIPIDEMIA, UNSPECIFIED: ICD-10-CM

## 2019-02-15 DIAGNOSIS — Z79.82 LONG TERM (CURRENT) USE OF ASPIRIN: ICD-10-CM

## 2019-02-15 DIAGNOSIS — Z79.4 LONG TERM (CURRENT) USE OF INSULIN: ICD-10-CM

## 2019-02-15 DIAGNOSIS — M54.9 DORSALGIA, UNSPECIFIED: ICD-10-CM

## 2019-02-15 DIAGNOSIS — Z91.81 HISTORY OF FALLING: ICD-10-CM

## 2019-02-15 DIAGNOSIS — I12.9 HYPERTENSIVE CHRONIC KIDNEY DISEASE WITH STAGE 1 THROUGH STAGE 4 CHRONIC KIDNEY DISEASE, OR UNSPECIFIED CHRONIC KIDNEY DISEASE: ICD-10-CM

## 2019-02-15 DIAGNOSIS — F03.90 UNSPECIFIED DEMENTIA WITHOUT BEHAVIORAL DISTURBANCE: ICD-10-CM

## 2019-02-15 DIAGNOSIS — G89.11 ACUTE PAIN DUE TO TRAUMA: ICD-10-CM

## 2019-02-15 PROBLEM — F32.9 MAJOR DEPRESSIVE DISORDER, SINGLE EPISODE, UNSPECIFIED: Chronic | Status: ACTIVE | Noted: 2018-01-07

## 2019-02-15 PROBLEM — I10 ESSENTIAL (PRIMARY) HYPERTENSION: Chronic | Status: ACTIVE | Noted: 2018-01-07

## 2019-02-15 PROBLEM — N18.4 CHRONIC KIDNEY DISEASE, STAGE 4 (SEVERE): Chronic | Status: ACTIVE | Noted: 2018-01-07

## 2019-02-15 PROBLEM — E11.9 TYPE 2 DIABETES MELLITUS WITHOUT COMPLICATIONS: Chronic | Status: ACTIVE | Noted: 2018-01-07

## 2019-02-15 PROCEDURE — 73502 X-RAY EXAM HIP UNI 2-3 VIEWS: CPT | Mod: 26,LT

## 2019-02-15 PROCEDURE — 71250 CT THORAX DX C-: CPT | Mod: 26

## 2019-02-15 PROCEDURE — 72125 CT NECK SPINE W/O DYE: CPT | Mod: 26

## 2019-02-15 PROCEDURE — 74176 CT ABD & PELVIS W/O CONTRAST: CPT | Mod: 26

## 2019-02-15 PROCEDURE — 99284 EMERGENCY DEPT VISIT MOD MDM: CPT

## 2019-02-15 PROCEDURE — 70450 CT HEAD/BRAIN W/O DYE: CPT | Mod: 26

## 2019-02-15 PROCEDURE — 93010 ELECTROCARDIOGRAM REPORT: CPT

## 2019-02-15 PROCEDURE — 73552 X-RAY EXAM OF FEMUR 2/>: CPT | Mod: 26,LT

## 2019-02-15 RX ORDER — ACETAMINOPHEN 500 MG
975 TABLET ORAL ONCE
Qty: 0 | Refills: 0 | Status: DISCONTINUED | OUTPATIENT
Start: 2019-02-15 | End: 2019-02-15

## 2019-02-15 RX ORDER — ACETAMINOPHEN 500 MG
1000 TABLET ORAL ONCE
Qty: 0 | Refills: 0 | Status: COMPLETED | OUTPATIENT
Start: 2019-02-15 | End: 2019-02-15

## 2019-02-15 RX ADMIN — Medication 1000 MILLIGRAM(S): at 09:16

## 2019-02-15 NOTE — ED PROVIDER NOTE - OUTSIDE FACILITY/AGENCY DETAILS FREE TEXT FOR MDM ADDL HISTORY OBTAINED FROM QUESTION
Called nursing home at [phone] on date of visit, Spoke with nurse caring for patient. Patient sustained a fall two days ago, landing on his buttocks. Now c/o left hip pain and unable to walk.

## 2019-02-15 NOTE — ED ADULT TRIAGE NOTE - CHIEF COMPLAINT QUOTE
unwitnessed fall yesterday, complaining of back pain, pt poor historian. asa use daily. trauma alert initiated.

## 2019-02-15 NOTE — ED PROVIDER NOTE - PROGRESS NOTE DETAILS
Attending Govea, cancel trauma alert.  Pt awake, gcs 15, and some back pain with sitting up. Kitty Aguirre for Dr Brayan Govea : 89 yo M with back pain. Hx of dementia and pt is unreliable historian. Per EMS, had unwitnessed fall yesterday. Now with back pain.  Exam: non tender back. However, when seated upright, pt c/o pain in the midback. Kitty Aguirre for Dr Brayan Govea- Attending Contribution to Care : 89 yo M with back pain. Hx of dementia and pt is unreliable historian. Per EMS, had unwitnessed fall yesterday. Now with back pain.  Exam: non tender back. However, when seated upright, pt c/o pain in the midback. HHA arrives, confirms patient is at baseline, patient able to ambulate around his hospital room at his baseline level, informed aide of possible metastatic findings on CT and need to f/u with PMD, will provide with copy of results. -SM

## 2019-02-15 NOTE — ED PROVIDER NOTE - CLINICAL SUMMARY MEDICAL DECISION MAKING FREE TEXT BOX
Well appearing with minimal pain on exam, however will get trauma c/a/p CT to r/o traumatic injury given patient is poor historian. Low suspicion for syncope.

## 2019-02-15 NOTE — ED ADULT NURSE NOTE - NSIMPLEMENTINTERV_GEN_ALL_ED
Implemented All Fall with Harm Risk Interventions:  [place] to call system. Call bell, personal items and telephone within reach. Instruct patient to call for assistance. Room bathroom lighting operational. Non-slip footwear when patient is off stretcher. Physically safe environment: no spills, clutter or unnecessary equipment. Stretcher in lowest position, wheels locked, appropriate side rails in place. Provide visual cue, wrist band, yellow gown, etc. Monitor gait and stability. Monitor for mental status changes and reorient to person, place, and time. Review medications for side effects contributing to fall risk. Reinforce activity limits and safety measures with patient and family. Provide visual clues: red socks.

## 2019-02-15 NOTE — ED PROVIDER NOTE - MUSCULOSKELETAL, MLM
Spine appears normal, range of motion is not limited, no muscle or joint tenderness, chest wall nontender, +back pain reproduced when lifting LLE

## 2019-02-15 NOTE — ED ADULT NURSE NOTE - OBJECTIVE STATEMENT
sent in from [facility] for unwitnessed fall, c/o left back and left hip pain, unknown LOC, pt takes ASA

## 2019-02-15 NOTE — ED PROVIDER NOTE - ATTENDING CONTRIBUTION TO CARE
I, Brayan Govea MD, personally saw the patient with the resident, and completed the key components of the history and physical exam. I then discussed the management plan with the resident.

## 2019-02-15 NOTE — ED PROVIDER NOTE - OBJECTIVE STATEMENT
Patient is 89yM with PMH htn, hld, DM, ckd, dementia presenting with unwitnessed fall yesterday, reportedly had been complaining of back pain, patient with has no memory of a fall and currently complains of bilateral low muscular back pain when moving, otherwise has no pain. On asa 81, no other anticoagulants.     PMD: James Mason  ROS: Denies fever, palpitations, chills, recent sickness, HA, vision changes, cough, SOB, chest pain, abdominal pain, n/v/d/c, dysuria, hematuria, rash, new joint aches, sick contacts, and recent travel.

## 2019-02-15 NOTE — ED PROVIDER NOTE - NSFOLLOWUPINSTRUCTIONS_ED_ALL_ED_FT
1. Return to ED for worsening, progressive or any other concerning symptoms such as trouble breathing, severe pain, trouble walking, inability to eat/drink due to vomiting, or confusion  2. Follow up with your primary care doctor in 2-3 days, have them review and follow up on the incidental findings on today's CT scan.     3. Please take tylenol 1000 mg every 6 hours as needed for pain control. Apply ice packs to areas of muscle soreness for 15-20 minutes at a time, 2-3 times per day.   4.

## 2019-02-22 ENCOUNTER — EMERGENCY (EMERGENCY)
Facility: HOSPITAL | Age: 84
LOS: 0 days | Discharge: HOME | End: 2019-02-23
Attending: EMERGENCY MEDICINE | Admitting: EMERGENCY MEDICINE
Payer: MEDICARE

## 2019-02-22 VITALS
WEIGHT: 149.91 LBS | SYSTOLIC BLOOD PRESSURE: 143 MMHG | HEIGHT: 67 IN | DIASTOLIC BLOOD PRESSURE: 61 MMHG | RESPIRATION RATE: 16 BRPM | TEMPERATURE: 98 F | HEART RATE: 81 BPM | OXYGEN SATURATION: 95 %

## 2019-02-22 DIAGNOSIS — E87.5 HYPERKALEMIA: ICD-10-CM

## 2019-02-22 DIAGNOSIS — F02.80 DEMENTIA IN OTHER DISEASES CLASSIFIED ELSEWHERE, UNSPECIFIED SEVERITY, WITHOUT BEHAVIORAL DISTURBANCE, PSYCHOTIC DISTURBANCE, MOOD DISTURBANCE, AND ANXIETY: ICD-10-CM

## 2019-02-22 DIAGNOSIS — G30.9 ALZHEIMER'S DISEASE, UNSPECIFIED: ICD-10-CM

## 2019-02-22 DIAGNOSIS — Z79.899 OTHER LONG TERM (CURRENT) DRUG THERAPY: ICD-10-CM

## 2019-02-22 DIAGNOSIS — Z90.89 ACQUIRED ABSENCE OF OTHER ORGANS: Chronic | ICD-10-CM

## 2019-02-22 DIAGNOSIS — I12.9 HYPERTENSIVE CHRONIC KIDNEY DISEASE WITH STAGE 1 THROUGH STAGE 4 CHRONIC KIDNEY DISEASE, OR UNSPECIFIED CHRONIC KIDNEY DISEASE: ICD-10-CM

## 2019-02-22 DIAGNOSIS — R11.10 VOMITING, UNSPECIFIED: ICD-10-CM

## 2019-02-22 DIAGNOSIS — Z79.82 LONG TERM (CURRENT) USE OF ASPIRIN: ICD-10-CM

## 2019-02-22 DIAGNOSIS — Z79.4 LONG TERM (CURRENT) USE OF INSULIN: ICD-10-CM

## 2019-02-22 DIAGNOSIS — N18.4 CHRONIC KIDNEY DISEASE, STAGE 4 (SEVERE): ICD-10-CM

## 2019-02-22 LAB
ALBUMIN SERPL ELPH-MCNC: 3.9 G/DL — SIGNIFICANT CHANGE UP (ref 3.3–5)
ALP SERPL-CCNC: 194 U/L — HIGH (ref 40–120)
ALT FLD-CCNC: 17 U/L — SIGNIFICANT CHANGE UP (ref 12–78)
ANION GAP SERPL CALC-SCNC: 8 MMOL/L — SIGNIFICANT CHANGE UP (ref 5–17)
APTT BLD: 33.1 SEC — SIGNIFICANT CHANGE UP (ref 27.5–36.3)
AST SERPL-CCNC: 19 U/L — SIGNIFICANT CHANGE UP (ref 15–37)
BASOPHILS # BLD AUTO: 0.05 K/UL — SIGNIFICANT CHANGE UP (ref 0–0.2)
BASOPHILS NFR BLD AUTO: 0.5 % — SIGNIFICANT CHANGE UP (ref 0–2)
BILIRUB SERPL-MCNC: 0.6 MG/DL — SIGNIFICANT CHANGE UP (ref 0.2–1.2)
BLD GP AB SCN SERPL QL: SIGNIFICANT CHANGE UP
BUN SERPL-MCNC: 35 MG/DL — HIGH (ref 7–23)
CALCIUM SERPL-MCNC: 8.7 MG/DL — SIGNIFICANT CHANGE UP (ref 8.5–10.1)
CHLORIDE SERPL-SCNC: 106 MMOL/L — SIGNIFICANT CHANGE UP (ref 96–108)
CO2 SERPL-SCNC: 26 MMOL/L — SIGNIFICANT CHANGE UP (ref 22–31)
CREAT SERPL-MCNC: 1.19 MG/DL — SIGNIFICANT CHANGE UP (ref 0.5–1.3)
EOSINOPHIL # BLD AUTO: 0.04 K/UL — SIGNIFICANT CHANGE UP (ref 0–0.5)
EOSINOPHIL NFR BLD AUTO: 0.4 % — SIGNIFICANT CHANGE UP (ref 0–6)
GLUCOSE SERPL-MCNC: 182 MG/DL — HIGH (ref 70–99)
HCT VFR BLD CALC: 48.6 % — SIGNIFICANT CHANGE UP (ref 39–50)
HGB BLD-MCNC: 15.9 G/DL — SIGNIFICANT CHANGE UP (ref 13–17)
IMM GRANULOCYTES NFR BLD AUTO: 0.4 % — SIGNIFICANT CHANGE UP (ref 0–1.5)
INR BLD: 1.03 RATIO — SIGNIFICANT CHANGE UP (ref 0.88–1.16)
LACTATE SERPL-SCNC: 1.4 MMOL/L — SIGNIFICANT CHANGE UP (ref 0.7–2)
LIDOCAIN IGE QN: 150 U/L — SIGNIFICANT CHANGE UP (ref 73–393)
LYMPHOCYTES # BLD AUTO: 0.14 K/UL — LOW (ref 1–3.3)
LYMPHOCYTES # BLD AUTO: 1.3 % — LOW (ref 13–44)
MANUAL SMEAR VERIFICATION: SIGNIFICANT CHANGE UP
MCHC RBC-ENTMCNC: 30.2 PG — SIGNIFICANT CHANGE UP (ref 27–34)
MCHC RBC-ENTMCNC: 32.7 GM/DL — SIGNIFICANT CHANGE UP (ref 32–36)
MCV RBC AUTO: 92.2 FL — SIGNIFICANT CHANGE UP (ref 80–100)
MONOCYTES # BLD AUTO: 0.5 K/UL — SIGNIFICANT CHANGE UP (ref 0–0.9)
MONOCYTES NFR BLD AUTO: 4.5 % — SIGNIFICANT CHANGE UP (ref 2–14)
NEUTROPHILS # BLD AUTO: 10.31 K/UL — HIGH (ref 1.8–7.4)
NEUTROPHILS NFR BLD AUTO: 92.9 % — HIGH (ref 43–77)
NRBC # BLD: 0 /100 WBCS — SIGNIFICANT CHANGE UP (ref 0–0)
PLAT MORPH BLD: NORMAL — SIGNIFICANT CHANGE UP
PLATELET # BLD AUTO: 157 K/UL — SIGNIFICANT CHANGE UP (ref 150–400)
POTASSIUM SERPL-MCNC: 4 MMOL/L — SIGNIFICANT CHANGE UP (ref 3.5–5.3)
POTASSIUM SERPL-SCNC: 4 MMOL/L — SIGNIFICANT CHANGE UP (ref 3.5–5.3)
PROT SERPL-MCNC: 7.4 GM/DL — SIGNIFICANT CHANGE UP (ref 6–8.3)
PROTHROM AB SERPL-ACNC: 11.5 SEC — SIGNIFICANT CHANGE UP (ref 10–12.9)
RBC # BLD: 5.27 M/UL — SIGNIFICANT CHANGE UP (ref 4.2–5.8)
RBC # FLD: 13.5 % — SIGNIFICANT CHANGE UP (ref 10.3–14.5)
RBC BLD AUTO: NORMAL — SIGNIFICANT CHANGE UP
SODIUM SERPL-SCNC: 140 MMOL/L — SIGNIFICANT CHANGE UP (ref 135–145)
TROPONIN I SERPL-MCNC: <0.015 NG/ML — SIGNIFICANT CHANGE UP (ref 0.01–0.04)
TYPE + AB SCN PNL BLD: SIGNIFICANT CHANGE UP
WBC # BLD: 11.08 K/UL — HIGH (ref 3.8–10.5)
WBC # FLD AUTO: 11.08 K/UL — HIGH (ref 3.8–10.5)

## 2019-02-22 PROCEDURE — 99285 EMERGENCY DEPT VISIT HI MDM: CPT | Mod: 25

## 2019-02-22 PROCEDURE — 93010 ELECTROCARDIOGRAM REPORT: CPT

## 2019-02-22 RX ORDER — SODIUM CHLORIDE 9 MG/ML
500 INJECTION INTRAMUSCULAR; INTRAVENOUS; SUBCUTANEOUS ONCE
Qty: 0 | Refills: 0 | Status: COMPLETED | OUTPATIENT
Start: 2019-02-22 | End: 2019-02-22

## 2019-02-22 RX ADMIN — SODIUM CHLORIDE 500 MILLILITER(S): 9 INJECTION INTRAMUSCULAR; INTRAVENOUS; SUBCUTANEOUS at 22:35

## 2019-02-22 NOTE — ED PROVIDER NOTE - CLINICAL SUMMARY MEDICAL DECISION MAKING FREE TEXT BOX
Agree with ACP for plan to get labs, IV fluids, and CT of abd with IV contrast to look for bleeding. If pt vomits in ED, will do Gastroccult.

## 2019-02-22 NOTE — ED ADULT TRIAGE NOTE - BSA (M2)
Indwelling parrish cath irrigation attempted with 60ml of sterile water, no water returned. Dr. Cordero informed   1.79

## 2019-02-22 NOTE — ED PROVIDER NOTE - PMH
CKD (chronic kidney disease), stage IV    Dementia  Alzheimer's  Depression    Diabetes  Insulin dependent  HTN (hypertension)    Hyperlipidemia

## 2019-02-22 NOTE — ED PROVIDER NOTE - ATTENDING CONTRIBUTION TO CARE
I, Rm Guerrero, performed the initial face to face bedside interview with this patient regarding history of present illness, review of symptoms and relevant past medical, social and family history.  I completed an independent physical examination.  I was the initial provider who evaluated this patient. I have signed out the follow up of any pending tests (i.e. labs, radiological studies) to the ACP.  I have communicated the patient’s plan of care and disposition with the ACP.  The history, relevant review of systems, past medical and surgical history, medical decision making, and physical examination was documented by the scribe in my presence and I attest to the accuracy of the documentation.

## 2019-02-22 NOTE — ED ADULT TRIAGE NOTE - CHIEF COMPLAINT QUOTE
As per EMS, vomiting since he last ate at 5:30 pm. EMS states that as per NH, bloody emesis, but EMS states patient vomited multiple times en route to ED, not bloody. Patient not actively vomiting at triage. Denies any complaints.

## 2019-02-22 NOTE — ED ADULT TRIAGE NOTE - TEMPERATURE IN CELSIUS (DEGREES C)
[facility] HOSPITALIST  Progress Note     Elver Garrett Patient Status:  Inpatient    1948 MRN [id number]   [facility] 4NW-A Attending Jeff Conteh MD   Hosp Day # 1 PCP Leah Tovar MD     Chief Complaint: Abdominal pain    S: Pa Oral Daily   • enoxaparin  40 mg Subcutaneous Daily   • docusate sodium  100 mg Oral BID   • fentaNYL  1 patch Transdermal Q72H       ASSESSMENT / PLAN:     1. Abdominal pain:  Resolved, ?due to constipation  2. MM  3.  HL    Plan of care: Home today    Alber Brewer 36.6

## 2019-02-22 NOTE — ED PROVIDER NOTE - PROGRESS NOTE DETAILS
Kitty NP for Dr. Guerrero: 89 y/o male with PMHx CKD, dementia, depresion, DM, HTN, HLD presents to the ED c/o vomiting. As per nursing home information pt had episodes of hematemesis. On route to ED pt had multiple episodes of vomiting, non bloody. In ED, pt states he has some abd pain. Hx limited due to pt being a poor historian, dementia. Hx obtained from EMS and nursing home documents.    CONSTITUTIONAL: well appearing, well nourished, and in no apparent distress. EYES: clear bilaterally.  Pupils equal, round, and reactive to light. ENMT: Nasal mucosa clear.  Mouth with normal mucosa  Throat has no vesicles, no oropharyngeal exudates and uvula is midline. CARDIAC: normal rate, regular rhythm, and no murmur. RESPIRATORY: breath sounds clear and equal bilaterally. GASTROINTESTINAL: abdomen soft. +reducible umbilical hernia +TTP jennifer-umbilical area. Active bowel sounds. +some abd distension. MUSCULOSKELETAL: range of motion is not limited and there is no muscle tenderness. NEUROLOGICAL: sensation is normal and strength is normal. SKIN: skin normal color for race, warm, dry and intact.

## 2019-02-22 NOTE — ED PROVIDER NOTE - PHYSICAL EXAMINATION
**GEN - NAD; ; A+O to person   **PULMONARY - CTA b/l, symmetric breath sounds. ***CARDIAC -s1s2, RRR, no M,G,R  **ABDOMEN - soft, ND, umbilical hernia, no r/g/r  **SKIN - no rash or bruising   **NEUROLOGIC - follows commands, sensation nl, motor nl,

## 2019-02-22 NOTE — ED PROVIDER NOTE - OBJECTIVE STATEMENT
89 y/o M presents with vomiting. Unable to obtain hx secondary to dementia. As per EMS patent had multiple non bloody episodes of vomiting en route.

## 2019-02-23 VITALS
OXYGEN SATURATION: 95 % | TEMPERATURE: 99 F | SYSTOLIC BLOOD PRESSURE: 144 MMHG | DIASTOLIC BLOOD PRESSURE: 71 MMHG | HEART RATE: 91 BPM | RESPIRATION RATE: 16 BRPM

## 2019-02-23 PROCEDURE — 71045 X-RAY EXAM CHEST 1 VIEW: CPT | Mod: 26

## 2019-02-23 PROCEDURE — 74177 CT ABD & PELVIS W/CONTRAST: CPT | Mod: 26

## 2019-02-23 NOTE — ED ADULT NURSE NOTE - NSIMPLEMENTINTERV_GEN_ALL_ED
Implemented All Fall with Harm Risk Interventions:  [place] to call system. Call bell, personal items and telephone within reach. Instruct patient to call for assistance. Room bathroom lighting operational. Non-slip footwear when patient is off stretcher. Physically safe environment: no spills, clutter or unnecessary equipment. Stretcher in lowest position, wheels locked, appropriate side rails in place. Provide visual cue, wrist band, yellow gown, etc. Monitor gait and stability. Monitor for mental status changes and reorient to person, place, and time. Review medications for side effects contributing to fall risk. Reinforce activity limits and safety measures with patient and family. Provide visual clues: red socks.

## 2019-04-05 NOTE — ED ADULT NURSE NOTE - CHPI ED SYMPTOMS POS
Chronic obstructive pulmonary disease    Diverticulosis    Hiatal hernia    Osteoarthritis    PUD (peptic ulcer disease)
ABRASION

## 2019-08-29 ENCOUNTER — INPATIENT (INPATIENT)
Facility: HOSPITAL | Age: 84
LOS: 6 days | Discharge: NURSING, REHAB OR LONG-TERM CARE | DRG: 871 | End: 2019-09-05
Attending: INTERNAL MEDICINE | Admitting: HOSPITALIST
Payer: MEDICARE

## 2019-08-29 VITALS
OXYGEN SATURATION: 92 % | RESPIRATION RATE: 21 BRPM | HEART RATE: 71 BPM | SYSTOLIC BLOOD PRESSURE: 104 MMHG | WEIGHT: 160.06 LBS | HEIGHT: 67 IN | TEMPERATURE: 98 F | DIASTOLIC BLOOD PRESSURE: 46 MMHG

## 2019-08-29 DIAGNOSIS — Z90.89 ACQUIRED ABSENCE OF OTHER ORGANS: Chronic | ICD-10-CM

## 2019-08-29 DIAGNOSIS — J18.9 PNEUMONIA, UNSPECIFIED ORGANISM: ICD-10-CM

## 2019-08-29 LAB
ALBUMIN SERPL ELPH-MCNC: 3.3 G/DL — SIGNIFICANT CHANGE UP (ref 3.3–5)
ALP SERPL-CCNC: 260 U/L — HIGH (ref 40–120)
ALT FLD-CCNC: 16 U/L — SIGNIFICANT CHANGE UP (ref 12–78)
ANION GAP SERPL CALC-SCNC: 8 MMOL/L — SIGNIFICANT CHANGE UP (ref 5–17)
APPEARANCE UR: CLEAR — SIGNIFICANT CHANGE UP
APTT BLD: 31.2 SEC — SIGNIFICANT CHANGE UP (ref 27.5–36.3)
AST SERPL-CCNC: 36 U/L — SIGNIFICANT CHANGE UP (ref 15–37)
B PERT DNA SPEC QL NAA+PROBE: SIGNIFICANT CHANGE UP
BASOPHILS # BLD AUTO: 0 K/UL — SIGNIFICANT CHANGE UP (ref 0–0.2)
BASOPHILS NFR BLD AUTO: 0 % — SIGNIFICANT CHANGE UP (ref 0–2)
BILIRUB SERPL-MCNC: 0.8 MG/DL — SIGNIFICANT CHANGE UP (ref 0.2–1.2)
BILIRUB UR-MCNC: NEGATIVE — SIGNIFICANT CHANGE UP
BUN SERPL-MCNC: 34 MG/DL — HIGH (ref 7–23)
C PNEUM DNA SPEC QL NAA+PROBE: SIGNIFICANT CHANGE UP
CALCIUM SERPL-MCNC: 8.6 MG/DL — SIGNIFICANT CHANGE UP (ref 8.5–10.1)
CHLORIDE SERPL-SCNC: 107 MMOL/L — SIGNIFICANT CHANGE UP (ref 96–108)
CO2 SERPL-SCNC: 27 MMOL/L — SIGNIFICANT CHANGE UP (ref 22–31)
COLOR SPEC: YELLOW — SIGNIFICANT CHANGE UP
CREAT SERPL-MCNC: 1.46 MG/DL — HIGH (ref 0.5–1.3)
DIFF PNL FLD: ABNORMAL
EOSINOPHIL # BLD AUTO: 0 K/UL — SIGNIFICANT CHANGE UP (ref 0–0.5)
EOSINOPHIL NFR BLD AUTO: 0 % — SIGNIFICANT CHANGE UP (ref 0–6)
FLUAV H1 2009 PAND RNA SPEC QL NAA+PROBE: SIGNIFICANT CHANGE UP
FLUAV H1 RNA SPEC QL NAA+PROBE: SIGNIFICANT CHANGE UP
FLUAV H3 RNA SPEC QL NAA+PROBE: SIGNIFICANT CHANGE UP
FLUAV SUBTYP SPEC NAA+PROBE: SIGNIFICANT CHANGE UP
FLUBV RNA SPEC QL NAA+PROBE: SIGNIFICANT CHANGE UP
GLUCOSE SERPL-MCNC: 138 MG/DL — HIGH (ref 70–99)
GLUCOSE UR QL: 250 MG/DL
HADV DNA SPEC QL NAA+PROBE: SIGNIFICANT CHANGE UP
HCOV PNL SPEC NAA+PROBE: SIGNIFICANT CHANGE UP
HCT VFR BLD CALC: 44.6 % — SIGNIFICANT CHANGE UP (ref 39–50)
HGB BLD-MCNC: 14.6 G/DL — SIGNIFICANT CHANGE UP (ref 13–17)
HMPV RNA SPEC QL NAA+PROBE: SIGNIFICANT CHANGE UP
HPIV1 RNA SPEC QL NAA+PROBE: SIGNIFICANT CHANGE UP
HPIV2 RNA SPEC QL NAA+PROBE: SIGNIFICANT CHANGE UP
HPIV3 RNA SPEC QL NAA+PROBE: SIGNIFICANT CHANGE UP
HPIV4 RNA SPEC QL NAA+PROBE: SIGNIFICANT CHANGE UP
INR BLD: 1.19 RATIO — HIGH (ref 0.88–1.16)
KETONES UR-MCNC: ABNORMAL
LACTATE SERPL-SCNC: 2.3 MMOL/L — HIGH (ref 0.7–2)
LEUKOCYTE ESTERASE UR-ACNC: ABNORMAL
LYMPHOCYTES # BLD AUTO: 0.7 K/UL — LOW (ref 1–3.3)
LYMPHOCYTES # BLD AUTO: 5 % — LOW (ref 13–44)
MCHC RBC-ENTMCNC: 30.3 PG — SIGNIFICANT CHANGE UP (ref 27–34)
MCHC RBC-ENTMCNC: 32.7 GM/DL — SIGNIFICANT CHANGE UP (ref 32–36)
MCV RBC AUTO: 92.5 FL — SIGNIFICANT CHANGE UP (ref 80–100)
MONOCYTES # BLD AUTO: 0.42 K/UL — SIGNIFICANT CHANGE UP (ref 0–0.9)
MONOCYTES NFR BLD AUTO: 3 % — SIGNIFICANT CHANGE UP (ref 2–14)
NEUTROPHILS # BLD AUTO: 12.71 K/UL — HIGH (ref 1.8–7.4)
NEUTROPHILS NFR BLD AUTO: 71 % — SIGNIFICANT CHANGE UP (ref 43–77)
NITRITE UR-MCNC: NEGATIVE — SIGNIFICANT CHANGE UP
NRBC # BLD: SIGNIFICANT CHANGE UP /100 WBCS (ref 0–0)
NT-PROBNP SERPL-SCNC: 1966 PG/ML — HIGH (ref 0–450)
PH UR: 5 — SIGNIFICANT CHANGE UP (ref 5–8)
PLATELET # BLD AUTO: 172 K/UL — SIGNIFICANT CHANGE UP (ref 150–400)
POTASSIUM SERPL-MCNC: 4 MMOL/L — SIGNIFICANT CHANGE UP (ref 3.5–5.3)
POTASSIUM SERPL-SCNC: 4 MMOL/L — SIGNIFICANT CHANGE UP (ref 3.5–5.3)
PROT SERPL-MCNC: 6.6 GM/DL — SIGNIFICANT CHANGE UP (ref 6–8.3)
PROT UR-MCNC: 100 MG/DL
PROTHROM AB SERPL-ACNC: 13.3 SEC — HIGH (ref 10–12.9)
RAPID RVP RESULT: DETECTED
RBC # BLD: 4.82 M/UL — SIGNIFICANT CHANGE UP (ref 4.2–5.8)
RBC # FLD: 14.5 % — SIGNIFICANT CHANGE UP (ref 10.3–14.5)
RV+EV RNA SPEC QL NAA+PROBE: DETECTED
SODIUM SERPL-SCNC: 142 MMOL/L — SIGNIFICANT CHANGE UP (ref 135–145)
SP GR SPEC: 1.02 — SIGNIFICANT CHANGE UP (ref 1.01–1.02)
TROPONIN I SERPL-MCNC: 0.04 NG/ML — SIGNIFICANT CHANGE UP (ref 0.01–0.04)
TROPONIN I SERPL-MCNC: 0.05 NG/ML — HIGH (ref 0.01–0.04)
TROPONIN I SERPL-MCNC: 0.05 NG/ML — HIGH (ref 0.01–0.04)
UROBILINOGEN FLD QL: 1 MG/DL
WBC # BLD: 13.97 K/UL — HIGH (ref 3.8–10.5)
WBC # FLD AUTO: 13.97 K/UL — HIGH (ref 3.8–10.5)

## 2019-08-29 PROCEDURE — 93010 ELECTROCARDIOGRAM REPORT: CPT

## 2019-08-29 PROCEDURE — 83036 HEMOGLOBIN GLYCOSYLATED A1C: CPT

## 2019-08-29 PROCEDURE — 85027 COMPLETE CBC AUTOMATED: CPT

## 2019-08-29 PROCEDURE — 85025 COMPLETE CBC W/AUTO DIFF WBC: CPT

## 2019-08-29 PROCEDURE — 36415 COLL VENOUS BLD VENIPUNCTURE: CPT

## 2019-08-29 PROCEDURE — 84484 ASSAY OF TROPONIN QUANT: CPT

## 2019-08-29 PROCEDURE — 80048 BASIC METABOLIC PNL TOTAL CA: CPT

## 2019-08-29 PROCEDURE — 71260 CT THORAX DX C+: CPT | Mod: 26

## 2019-08-29 PROCEDURE — 97530 THERAPEUTIC ACTIVITIES: CPT | Mod: GP

## 2019-08-29 PROCEDURE — 70450 CT HEAD/BRAIN W/O DYE: CPT | Mod: 26

## 2019-08-29 PROCEDURE — 71045 X-RAY EXAM CHEST 1 VIEW: CPT | Mod: 26

## 2019-08-29 PROCEDURE — 93306 TTE W/DOPPLER COMPLETE: CPT

## 2019-08-29 PROCEDURE — 97116 GAIT TRAINING THERAPY: CPT | Mod: GP

## 2019-08-29 PROCEDURE — 97162 PT EVAL MOD COMPLEX 30 MIN: CPT | Mod: GP

## 2019-08-29 PROCEDURE — 82962 GLUCOSE BLOOD TEST: CPT

## 2019-08-29 RX ORDER — CEFEPIME 1 G/1
1000 INJECTION, POWDER, FOR SOLUTION INTRAMUSCULAR; INTRAVENOUS EVERY 12 HOURS
Refills: 0 | Status: DISCONTINUED | OUTPATIENT
Start: 2019-08-29 | End: 2019-09-05

## 2019-08-29 RX ORDER — INSULIN GLARGINE 100 [IU]/ML
20 INJECTION, SOLUTION SUBCUTANEOUS AT BEDTIME
Refills: 0 | Status: DISCONTINUED | OUTPATIENT
Start: 2019-08-29 | End: 2019-08-30

## 2019-08-29 RX ORDER — SODIUM CHLORIDE 9 MG/ML
1000 INJECTION INTRAMUSCULAR; INTRAVENOUS; SUBCUTANEOUS ONCE
Refills: 0 | Status: COMPLETED | OUTPATIENT
Start: 2019-08-29 | End: 2019-08-29

## 2019-08-29 RX ORDER — SODIUM CHLORIDE 9 MG/ML
3 INJECTION INTRAMUSCULAR; INTRAVENOUS; SUBCUTANEOUS ONCE
Refills: 0 | Status: COMPLETED | OUTPATIENT
Start: 2019-08-29 | End: 2019-08-29

## 2019-08-29 RX ORDER — SODIUM CHLORIDE 9 MG/ML
1300 INJECTION INTRAMUSCULAR; INTRAVENOUS; SUBCUTANEOUS ONCE
Refills: 0 | Status: COMPLETED | OUTPATIENT
Start: 2019-08-29 | End: 2019-08-29

## 2019-08-29 RX ORDER — HEPARIN SODIUM 5000 [USP'U]/ML
5000 INJECTION INTRAVENOUS; SUBCUTANEOUS EVERY 12 HOURS
Refills: 0 | Status: DISCONTINUED | OUTPATIENT
Start: 2019-08-29 | End: 2019-09-05

## 2019-08-29 RX ORDER — GLUCAGON INJECTION, SOLUTION 0.5 MG/.1ML
1 INJECTION, SOLUTION SUBCUTANEOUS ONCE
Refills: 0 | Status: DISCONTINUED | OUTPATIENT
Start: 2019-08-29 | End: 2019-09-05

## 2019-08-29 RX ORDER — CITALOPRAM 10 MG/1
1 TABLET, FILM COATED ORAL
Qty: 0 | Refills: 0 | DISCHARGE

## 2019-08-29 RX ORDER — ACETAMINOPHEN 500 MG
650 TABLET ORAL EVERY 6 HOURS
Refills: 0 | Status: DISCONTINUED | OUTPATIENT
Start: 2019-08-29 | End: 2019-09-05

## 2019-08-29 RX ORDER — DOCUSATE SODIUM 100 MG
1 CAPSULE ORAL
Qty: 0 | Refills: 0 | DISCHARGE

## 2019-08-29 RX ORDER — INSULIN LISPRO 100/ML
VIAL (ML) SUBCUTANEOUS
Refills: 0 | Status: DISCONTINUED | OUTPATIENT
Start: 2019-08-29 | End: 2019-09-05

## 2019-08-29 RX ORDER — HYDRALAZINE HCL 50 MG
1 TABLET ORAL
Qty: 0 | Refills: 0 | DISCHARGE

## 2019-08-29 RX ORDER — SIMVASTATIN 20 MG/1
10 TABLET, FILM COATED ORAL AT BEDTIME
Refills: 0 | Status: DISCONTINUED | OUTPATIENT
Start: 2019-08-29 | End: 2019-09-05

## 2019-08-29 RX ORDER — CITALOPRAM 10 MG/1
10 TABLET, FILM COATED ORAL DAILY
Refills: 0 | Status: DISCONTINUED | OUTPATIENT
Start: 2019-08-29 | End: 2019-09-05

## 2019-08-29 RX ORDER — ERGOCALCIFEROL 1.25 MG/1
1 CAPSULE ORAL
Qty: 0 | Refills: 0 | DISCHARGE

## 2019-08-29 RX ORDER — DEXTROSE 50 % IN WATER 50 %
12.5 SYRINGE (ML) INTRAVENOUS ONCE
Refills: 0 | Status: DISCONTINUED | OUTPATIENT
Start: 2019-08-29 | End: 2019-09-05

## 2019-08-29 RX ORDER — DEXTROSE 50 % IN WATER 50 %
15 SYRINGE (ML) INTRAVENOUS ONCE
Refills: 0 | Status: DISCONTINUED | OUTPATIENT
Start: 2019-08-29 | End: 2019-09-05

## 2019-08-29 RX ORDER — OMEPRAZOLE 10 MG/1
1 CAPSULE, DELAYED RELEASE ORAL
Qty: 0 | Refills: 0 | DISCHARGE

## 2019-08-29 RX ORDER — CEFEPIME 1 G/1
1000 INJECTION, POWDER, FOR SOLUTION INTRAMUSCULAR; INTRAVENOUS EVERY 12 HOURS
Refills: 0 | Status: DISCONTINUED | OUTPATIENT
Start: 2019-08-29 | End: 2019-08-29

## 2019-08-29 RX ORDER — MULTIVIT-MIN/FERROUS GLUCONATE 9 MG/15 ML
1 LIQUID (ML) ORAL
Qty: 0 | Refills: 0 | DISCHARGE

## 2019-08-29 RX ORDER — ASPIRIN/CALCIUM CARB/MAGNESIUM 324 MG
1 TABLET ORAL
Qty: 0 | Refills: 0 | DISCHARGE

## 2019-08-29 RX ORDER — SENNA PLUS 8.6 MG/1
2 TABLET ORAL AT BEDTIME
Refills: 0 | Status: DISCONTINUED | OUTPATIENT
Start: 2019-08-29 | End: 2019-09-05

## 2019-08-29 RX ORDER — ASPIRIN/CALCIUM CARB/MAGNESIUM 324 MG
81 TABLET ORAL DAILY
Refills: 0 | Status: DISCONTINUED | OUTPATIENT
Start: 2019-08-29 | End: 2019-09-05

## 2019-08-29 RX ORDER — INSULIN HUMAN 100 [IU]/ML
0 INJECTION, SOLUTION SUBCUTANEOUS
Qty: 0 | Refills: 0 | DISCHARGE

## 2019-08-29 RX ORDER — ACETAMINOPHEN 500 MG
2 TABLET ORAL
Qty: 0 | Refills: 0 | DISCHARGE

## 2019-08-29 RX ORDER — ONDANSETRON 8 MG/1
4 TABLET, FILM COATED ORAL EVERY 6 HOURS
Refills: 0 | Status: DISCONTINUED | OUTPATIENT
Start: 2019-08-29 | End: 2019-09-05

## 2019-08-29 RX ORDER — CEFEPIME 1 G/1
1000 INJECTION, POWDER, FOR SOLUTION INTRAMUSCULAR; INTRAVENOUS ONCE
Refills: 0 | Status: DISCONTINUED | OUTPATIENT
Start: 2019-08-29 | End: 2019-08-29

## 2019-08-29 RX ORDER — SIMVASTATIN 20 MG/1
1 TABLET, FILM COATED ORAL
Qty: 0 | Refills: 0 | DISCHARGE

## 2019-08-29 RX ORDER — INSULIN GLARGINE 100 [IU]/ML
20 INJECTION, SOLUTION SUBCUTANEOUS
Qty: 0 | Refills: 0 | DISCHARGE

## 2019-08-29 RX ORDER — SODIUM CHLORIDE 9 MG/ML
1000 INJECTION, SOLUTION INTRAVENOUS
Refills: 0 | Status: DISCONTINUED | OUTPATIENT
Start: 2019-08-29 | End: 2019-09-05

## 2019-08-29 RX ORDER — DOCUSATE SODIUM 100 MG
100 CAPSULE ORAL DAILY
Refills: 0 | Status: DISCONTINUED | OUTPATIENT
Start: 2019-08-29 | End: 2019-09-05

## 2019-08-29 RX ORDER — ACETAMINOPHEN 500 MG
1 TABLET ORAL
Qty: 0 | Refills: 0 | DISCHARGE

## 2019-08-29 RX ORDER — PREGABALIN 225 MG/1
1 CAPSULE ORAL
Qty: 0 | Refills: 0 | DISCHARGE

## 2019-08-29 RX ORDER — SENNA PLUS 8.6 MG/1
1 TABLET ORAL
Qty: 0 | Refills: 0 | DISCHARGE

## 2019-08-29 RX ORDER — VANCOMYCIN HCL 1 G
1000 VIAL (EA) INTRAVENOUS ONCE
Refills: 0 | Status: COMPLETED | OUTPATIENT
Start: 2019-08-29 | End: 2019-08-29

## 2019-08-29 RX ORDER — CEFEPIME 1 G/1
1000 INJECTION, POWDER, FOR SOLUTION INTRAMUSCULAR; INTRAVENOUS ONCE
Refills: 0 | Status: COMPLETED | OUTPATIENT
Start: 2019-08-29 | End: 2019-08-29

## 2019-08-29 RX ORDER — PANTOPRAZOLE SODIUM 20 MG/1
40 TABLET, DELAYED RELEASE ORAL
Refills: 0 | Status: DISCONTINUED | OUTPATIENT
Start: 2019-08-29 | End: 2019-09-05

## 2019-08-29 RX ADMIN — Medication 100 MILLIGRAM(S): at 23:04

## 2019-08-29 RX ADMIN — SIMVASTATIN 10 MILLIGRAM(S): 20 TABLET, FILM COATED ORAL at 23:04

## 2019-08-29 RX ADMIN — SODIUM CHLORIDE 1000 MILLILITER(S): 9 INJECTION INTRAMUSCULAR; INTRAVENOUS; SUBCUTANEOUS at 16:36

## 2019-08-29 RX ADMIN — SODIUM CHLORIDE 3 MILLILITER(S): 9 INJECTION INTRAMUSCULAR; INTRAVENOUS; SUBCUTANEOUS at 14:21

## 2019-08-29 RX ADMIN — HEPARIN SODIUM 5000 UNIT(S): 5000 INJECTION INTRAVENOUS; SUBCUTANEOUS at 18:55

## 2019-08-29 RX ADMIN — SODIUM CHLORIDE 1300 MILLILITER(S): 9 INJECTION INTRAMUSCULAR; INTRAVENOUS; SUBCUTANEOUS at 11:49

## 2019-08-29 RX ADMIN — CITALOPRAM 10 MILLIGRAM(S): 10 TABLET, FILM COATED ORAL at 23:04

## 2019-08-29 RX ADMIN — Medication 81 MILLIGRAM(S): at 18:55

## 2019-08-29 RX ADMIN — Medication 1000 MILLIGRAM(S): at 13:30

## 2019-08-29 RX ADMIN — Medication 250 MILLIGRAM(S): at 11:56

## 2019-08-29 RX ADMIN — SODIUM CHLORIDE 2000 MILLILITER(S): 9 INJECTION INTRAMUSCULAR; INTRAVENOUS; SUBCUTANEOUS at 14:17

## 2019-08-29 RX ADMIN — CEFEPIME 1000 MILLIGRAM(S): 1 INJECTION, POWDER, FOR SOLUTION INTRAMUSCULAR; INTRAVENOUS at 23:03

## 2019-08-29 RX ADMIN — SODIUM CHLORIDE 1300 MILLILITER(S): 9 INJECTION INTRAMUSCULAR; INTRAVENOUS; SUBCUTANEOUS at 13:30

## 2019-08-29 RX ADMIN — CEFEPIME 1000 MILLIGRAM(S): 1 INJECTION, POWDER, FOR SOLUTION INTRAMUSCULAR; INTRAVENOUS at 12:08

## 2019-08-29 NOTE — H&P ADULT - NSHPLABSRESULTS_GEN_ALL_CORE
LABS: All Labs Reviewed:                        14.6   13.97 )-----------( 172      ( 29 Aug 2019 11:31 )             44.6     08-29    142  |  107  |  34<H>  ----------------------------<  138<H>  4.0   |  27  |  1.46<H>    Ca    8.6      29 Aug 2019 11:31    TPro  6.6  /  Alb  3.3  /  TBili  0.8  /  DBili  x   /  AST  36  /  ALT  16  /  AlkPhos  260<H>  08-29    PT/INR - ( 29 Aug 2019 11:31 )   PT: 13.3 sec;   INR: 1.19 ratio         PTT - ( 29 Aug 2019 11:31 )  PTT:31.2 sec  CARDIAC MARKERS ( 29 Aug 2019 14:13 )  0.047 ng/mL / x     / x     / x     / x      CARDIAC MARKERS ( 29 Aug 2019 11:31 )  0.048 ng/mL / x     / x     / x     / x              Blood Culture:             < from: CT Angio Chest PE Protocol w/ IV Cont (08.29.19 @ 13:39) >    The mediastinum great vessels , ectasia of the ascending thoracic aorta,   4 cm in the AP diameter.There are no pulmonary arterial filling defects   to suggest pulmonary embolism.    Development of some  subcarinal lymphadenopathy, the summation of 2.1 x 2   x 2.5 cm in diameters.     The heart is mildly enlarged.    A limited evaluation of the upper abdomen, a vague 8 mm hypodensity in   the right lobe of the liver is unchanged. A 2.5 mm hypodense lesion in   the left lobe is unchanged. A 9 mm hypodense lesion in segment 5. These   lesions cannot be accurately characterized due to their early arterial   phase of contrast injection.    The bones , dorsal kyphosis. Increased  density of vertebral body of T12   and T7 and some hypertrophic changes in the thoracic spine. Correlation   with a  bone scan is recommended.    IMPRESSION:  No evidence of pulmonary embolism.   Multifocal pneumonia is the consideration and clinical correlation.   Associated small bilateral pleural effusions.  Other findings as above.

## 2019-08-29 NOTE — H&P ADULT - ASSESSMENT
89 y/o male with a PMHx of Alzheimer's, CKD, Dementia, Depression, DM, HTN, HLD, presents to the ED BIBA from [facility] c/o shortness of breath. Pt was sent to ED for lethargy, generalized weakness, SOB, and productive cough. Pt presents afebrile in the ED. PCP: Dr. Mason. Full HPI is unobtainable due to pt being a poor historian. History obtained from chart as patient unable to given any collateral info    PT had low grade temp in ED of 99.9. Found to have multifocal pna on CTA with no evidence of PE. 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  Lcattae 2.3 s/p IVF. Repeat 1.9. Given vanco and cefepime in ED. 2 sets troponin in ED 0.04->0.04. Scr 1.4 with WBC count 13 with 20% bandemia        A:  Sepsis with bandemia 2/2 to mulitfocal pna  Enterovirus/Rhinovirus  VANESSA/ATN 2/2 to sepsis  Elevated lactate  Troponin elevation suspect demand with juxtaposed VANESSA on CKD III  DM/HTN/hyperlipidemia/Dementia      P:  Admit to tele and monitor for 24 hours  Trend trops. Obtain 2D echo. Cardio consult  Tight BP control for anuerysm. ASA, statin  s/p vanco in ED. Continue cefepime renally dosed. ID consult  Gentle IVF. Monitor Scr.   Resume home meds. Avoid nephrotoxins  DVT px    IMPROVE VTE Individual Risk Assessment    RISK                                                                Points    [  ] Previous VTE                                                  3    [  ] Thrombophilia                                               2    [  ] Lower limb paralysis                                      2        (unable to hold up >15 seconds)      [  ] Current Cancer                                              2         (within 6 months)    [  ] Immobilization > 24 hrs                                1    [  ] ICU/CCU stay > 24 hours                              1    [  x] Age > 60                                                      1    IMPROVE VTE Score _________    IMPROVE Score 0-1: Low Risk, No VTE prophylaxis required for most patients, encourage ambulation.   IMPROVE Score 2-3: At risk, pharmacologic VTE prophylaxis is indicated for most patients (in the absence of a contraindication)  IMPROVE Score > or = 4: High Risk, pharmacologic VTE prophylaxis is indicated for most patients (in the absence of a contraindication)

## 2019-08-29 NOTE — ED PROVIDER NOTE - PROGRESS NOTE DETAILS
Kitty Govea: attempted to call [facility], phone rings but no one picks up. Kitty Govea: spoke with dr yang hospitalist for possible admission

## 2019-08-29 NOTE — ED PROVIDER NOTE - OBJECTIVE STATEMENT
89 y/o male with a PMHx of CKD, Dementia, Depression, DM, HTN, HLD, presents to the ED BIBA from [facility] c/o shortness of breath. Pt was sent to ED for lethargy, generalized weakness, SOB, and productive cough. Pt presents afebrile in the ED. Full HPI is unobtainable due to pt being a poor historian. 89 y/o male with a PMHx of CKD, Dementia, Depression, DM, HTN, HLD, presents to the ED BIBA from [facility] c/o shortness of breath. Pt was sent to ED for lethargy, generalized weakness, SOB, and productive cough. Pt presents afebrile in the ED. Full HPI is unobtainable due to pt being a poor historian. 89 y/o male with a PMHx of CKD, Dementia, Depression, DM, HTN, HLD, presents to the ED BIBA from [facility] c/o shortness of breath. Pt was sent to ED for lethargy, generalized weakness, SOB, and productive cough. Pt presents afebrile in the ED. PCP: Dr. Mcdonnell. Full HPI is unobtainable due to pt being a poor historian. 89 y/o male with a PMHx of Alzheimer's, CKD, Dementia, Depression, DM, HTN, HLD, presents to the ED BIBA from [facility] c/o shortness of breath. Pt was sent to ED for lethargy, generalized weakness, SOB, and productive cough. Pt presents afebrile in the ED. PCP: Dr. Mcdonnell. Full HPI is unobtainable due to pt being a poor historian.

## 2019-08-29 NOTE — ED PROVIDER NOTE - MUSCULOSKELETAL, MLM
Spine appears normal, range of motion is not limited, no muscle or joint tenderness. +1 pitting edema.

## 2019-08-29 NOTE — ED ADULT NURSE NOTE - OBJECTIVE STATEMENT
pt [facility] ED [facility] [place]. pt alert and oriented to self, VSS 00/0 rectal temp/  as per ems pt has been more weak and lethargiic  over the past few days and has productive cough. pt o2 sat 85 on room air. pt placed on O2 per Md  order. pt resting comfortably in bed, deneis pain, safety maintained will continue to monitor

## 2019-08-29 NOTE — ED ADULT NURSE NOTE - NSIMPLEMENTINTERV_GEN_ALL_ED
Implemented All Fall Risk Interventions:  [place] to call system. Call bell, personal items and telephone within reach. Instruct patient to call for assistance. Room bathroom lighting operational. Non-slip footwear when patient is off stretcher. Physically safe environment: no spills, clutter or unnecessary equipment. Stretcher in lowest position, wheels locked, appropriate side rails in place. Provide visual cue, wrist band, yellow gown, etc. Monitor gait and stability. Monitor for mental status changes and reorient to person, place, and time. Review medications for side effects contributing to fall risk. Reinforce activity limits and safety measures with patient and family.

## 2019-08-29 NOTE — H&P ADULT - NSHPPHYSICALEXAM_GEN_ALL_CORE
ICU Vital Signs Last 24 Hrs  T(C): 37.1 (29 Aug 2019 15:25), Max: 37.7 (29 Aug 2019 11:50)  T(F): 98.7 (29 Aug 2019 15:25), Max: 99.9 (29 Aug 2019 11:50)  HR: 75 (29 Aug 2019 15:25) (60 - 75)  BP: 110/55 (29 Aug 2019 15:25) (101/45 - 114/51)  BP(mean): --  ABP: --  ABP(mean): --  RR: 23 (29 Aug 2019 15:25) (17 - 23)  SpO2: 97% (29 Aug 2019 15:25) (92% - 97%)        PHYSICAL EXAM:    Constitutional: NAD, awake in nad  HEENT: PERR, EOMI,   Neck: Soft and supple, No LAD, No JVD  Respiratory: rales noted to lright lung base, no rhonchi  Cardiovascular: S1 and S2, rrr  Gastrointestinal: Bowel Sounds S, NT/ND  Extremities: No peripheral edema  Vascular: 2+ peripheral pulses  Neurological: A/O x 1, no focal deficits  Musculoskeletal: 5/5 strength b/l upper and lower extremities  Skin: No rashes

## 2019-08-29 NOTE — ED ADULT TRIAGE NOTE - CHIEF COMPLAINT QUOTE
Pt alert and oriented x2. Pt at baseline mental status. Pt sent in from [facility] for SOB, lethargy weakness and decreased PO intake. EMS states pt o2 on RA was 89%. Pt denies chest pain. No fevers.

## 2019-08-30 LAB
ANION GAP SERPL CALC-SCNC: 6 MMOL/L — SIGNIFICANT CHANGE UP (ref 5–17)
BASOPHILS # BLD AUTO: 0.1 K/UL — SIGNIFICANT CHANGE UP (ref 0–0.2)
BASOPHILS NFR BLD AUTO: 1 % — SIGNIFICANT CHANGE UP (ref 0–2)
BUN SERPL-MCNC: 31 MG/DL — HIGH (ref 7–23)
CALCIUM SERPL-MCNC: 8.1 MG/DL — LOW (ref 8.5–10.1)
CHLORIDE SERPL-SCNC: 111 MMOL/L — HIGH (ref 96–108)
CO2 SERPL-SCNC: 25 MMOL/L — SIGNIFICANT CHANGE UP (ref 22–31)
CREAT SERPL-MCNC: 1.09 MG/DL — SIGNIFICANT CHANGE UP (ref 0.5–1.3)
CULTURE RESULTS: NO GROWTH — SIGNIFICANT CHANGE UP
EOSINOPHIL # BLD AUTO: 0 K/UL — SIGNIFICANT CHANGE UP (ref 0–0.5)
EOSINOPHIL NFR BLD AUTO: 0 % — SIGNIFICANT CHANGE UP (ref 0–6)
GLUCOSE SERPL-MCNC: 35 MG/DL — CRITICAL LOW (ref 70–99)
HBA1C BLD-MCNC: 5.6 % — SIGNIFICANT CHANGE UP (ref 4–5.6)
HCT VFR BLD CALC: 37.9 % — LOW (ref 39–50)
HGB BLD-MCNC: 12.4 G/DL — LOW (ref 13–17)
LYMPHOCYTES # BLD AUTO: 1.33 K/UL — SIGNIFICANT CHANGE UP (ref 1–3.3)
LYMPHOCYTES # BLD AUTO: 14 % — SIGNIFICANT CHANGE UP (ref 13–44)
MCHC RBC-ENTMCNC: 30.2 PG — SIGNIFICANT CHANGE UP (ref 27–34)
MCHC RBC-ENTMCNC: 32.7 GM/DL — SIGNIFICANT CHANGE UP (ref 32–36)
MCV RBC AUTO: 92.2 FL — SIGNIFICANT CHANGE UP (ref 80–100)
MONOCYTES # BLD AUTO: 0.38 K/UL — SIGNIFICANT CHANGE UP (ref 0–0.9)
MONOCYTES NFR BLD AUTO: 4 % — SIGNIFICANT CHANGE UP (ref 2–14)
NEUTROPHILS # BLD AUTO: 7.7 K/UL — HIGH (ref 1.8–7.4)
NEUTROPHILS NFR BLD AUTO: 74 % — SIGNIFICANT CHANGE UP (ref 43–77)
NRBC # BLD: SIGNIFICANT CHANGE UP /100 WBCS (ref 0–0)
PLATELET # BLD AUTO: 143 K/UL — LOW (ref 150–400)
POTASSIUM SERPL-MCNC: 3.6 MMOL/L — SIGNIFICANT CHANGE UP (ref 3.5–5.3)
POTASSIUM SERPL-SCNC: 3.6 MMOL/L — SIGNIFICANT CHANGE UP (ref 3.5–5.3)
RBC # BLD: 4.11 M/UL — LOW (ref 4.2–5.8)
RBC # FLD: 14.6 % — HIGH (ref 10.3–14.5)
SODIUM SERPL-SCNC: 142 MMOL/L — SIGNIFICANT CHANGE UP (ref 135–145)
SPECIMEN SOURCE: SIGNIFICANT CHANGE UP
WBC # BLD: 9.5 K/UL — SIGNIFICANT CHANGE UP (ref 3.8–10.5)
WBC # FLD AUTO: 9.5 K/UL — SIGNIFICANT CHANGE UP (ref 3.8–10.5)

## 2019-08-30 PROCEDURE — 93306 TTE W/DOPPLER COMPLETE: CPT | Mod: 26

## 2019-08-30 RX ORDER — DEXTROSE 10 % IN WATER 10 %
1000 INTRAVENOUS SOLUTION INTRAVENOUS
Refills: 0 | Status: DISCONTINUED | OUTPATIENT
Start: 2019-08-30 | End: 2019-08-31

## 2019-08-30 RX ORDER — ACETAMINOPHEN 500 MG
650 TABLET ORAL ONCE
Refills: 0 | Status: COMPLETED | OUTPATIENT
Start: 2019-08-30 | End: 2019-08-30

## 2019-08-30 RX ORDER — DEXTROSE 50 % IN WATER 50 %
12.5 SYRINGE (ML) INTRAVENOUS ONCE
Refills: 0 | Status: COMPLETED | OUTPATIENT
Start: 2019-08-30 | End: 2019-08-30

## 2019-08-30 RX ORDER — DEXTROSE 50 % IN WATER 50 %
15 SYRINGE (ML) INTRAVENOUS ONCE
Refills: 0 | Status: COMPLETED | OUTPATIENT
Start: 2019-08-30 | End: 2019-08-30

## 2019-08-30 RX ADMIN — CEFEPIME 1000 MILLIGRAM(S): 1 INJECTION, POWDER, FOR SOLUTION INTRAMUSCULAR; INTRAVENOUS at 17:42

## 2019-08-30 RX ADMIN — Medication 15 GRAM(S): at 07:50

## 2019-08-30 RX ADMIN — Medication 650 MILLIGRAM(S): at 15:20

## 2019-08-30 RX ADMIN — HEPARIN SODIUM 5000 UNIT(S): 5000 INJECTION INTRAVENOUS; SUBCUTANEOUS at 06:08

## 2019-08-30 RX ADMIN — Medication 81 MILLIGRAM(S): at 12:19

## 2019-08-30 RX ADMIN — SIMVASTATIN 10 MILLIGRAM(S): 20 TABLET, FILM COATED ORAL at 21:13

## 2019-08-30 RX ADMIN — HEPARIN SODIUM 5000 UNIT(S): 5000 INJECTION INTRAVENOUS; SUBCUTANEOUS at 17:42

## 2019-08-30 RX ADMIN — Medication 35 MILLILITER(S): at 08:34

## 2019-08-30 RX ADMIN — Medication 650 MILLIGRAM(S): at 12:19

## 2019-08-30 RX ADMIN — CITALOPRAM 10 MILLIGRAM(S): 10 TABLET, FILM COATED ORAL at 12:19

## 2019-08-30 RX ADMIN — PANTOPRAZOLE SODIUM 40 MILLIGRAM(S): 20 TABLET, DELAYED RELEASE ORAL at 06:08

## 2019-08-30 RX ADMIN — Medication 12.5 GRAM(S): at 08:33

## 2019-08-30 RX ADMIN — CEFEPIME 1000 MILLIGRAM(S): 1 INJECTION, POWDER, FOR SOLUTION INTRAMUSCULAR; INTRAVENOUS at 06:07

## 2019-08-30 NOTE — CONSULT NOTE ADULT - ASSESSMENT
89 y/o male with a PMHx of Alzheimer's Dementia, Depression, DM, HTN, HLD, admitted on 8/29 from [facility] for evaluation of lethargy, weakness, shortness of breath and productive cough; the patient had fever to 100 upon admission and mild elevated lactate. History per medical record as patient is unable to provide history.    1. Patient admitted with pneumonia which will treat as health care associated pneumonia given that patient admitted from [facility]  - patient at risk for gram negative rods and other resistant bacteria   - oxygen and nebs as needed   - iv hydration and supportive care   - serial cbc and monitor temperature   - reviewed prior medical records to evaluate for resistant or atypical pathogens   - agree with cefepime as ordered  - will hold on further vancomycin for now given renal function  2. other issues: per medicine

## 2019-08-30 NOTE — CONSULT NOTE ADULT - SUBJECTIVE AND OBJECTIVE BOX
Patient is a 89y old  Male who presents with a chief complaint of cough (30 Aug 2019 07:06)    HPI:   89 y/o male with a PMHx of Alzheimer's Dementia, Depression, DM, HTN, HLD, admitted on  from sn[facility] for evaluation of lethargy, weakness, shortness of breath and productive cough; the patient had fever to 100 upon admission and mild elevated lactate. History per medical record as patient is unable to provide history.        PMH: as above  PSH: as above  Meds: per reconciliation sheet, noted below  MEDICATIONS  (STANDING):  aspirin  chewable 81 milliGRAM(s) Oral daily  cefepime  Injectable. 1000 milliGRAM(s) IV Push every 12 hours  citalopram 10 milliGRAM(s) Oral daily  dextrose 10%. 1000 milliLiter(s) (35 mL/Hr) IV Continuous <Continuous>  dextrose 5%. 1000 milliLiter(s) (50 mL/Hr) IV Continuous <Continuous>  dextrose 50% Injectable 12.5 Gram(s) IV Push once  docusate sodium 100 milliGRAM(s) Oral daily  heparin  Injectable 5000 Unit(s) SubCutaneous every 12 hours  insulin lispro (HumaLOG) corrective regimen sliding scale   SubCutaneous three times a day before meals  pantoprazole    Tablet 40 milliGRAM(s) Oral before breakfast  simvastatin 10 milliGRAM(s) Oral at bedtime    MEDICATIONS  (PRN):  acetaminophen   Tablet .. 650 milliGRAM(s) Oral every 6 hours PRN Mild Pain (1 - 3)  aluminum hydroxide/magnesium hydroxide/simethicone Suspension 30 milliLiter(s) Oral every 4 hours PRN Dyspepsia  dextrose 40% Gel 15 Gram(s) Oral once PRN Blood Glucose LESS THAN 70 milliGRAM(s)/deciliter  glucagon  Injectable 1 milliGRAM(s) IntraMuscular once PRN Glucose LESS THAN 70 milligrams/deciliter  ondansetron Injectable 4 milliGRAM(s) IV Push every 6 hours PRN Nausea  senna 2 Tablet(s) Oral at bedtime PRN Constipation    Allergies    No Known Allergies    Intolerances      Social: no smoking, no alcohol, no illegal drugs; no recent travel, no exposure to TB  FAMILY HISTORY:  No pertinent family history in first degree relatives     no history of premature cardiovascular disease in first degree relatives  ROS: unable to obtain secondary to patient medical condition     Vital Signs Last 24 Hrs  T(C): 39.6 (30 Aug 2019 11:55), Max: 39.6 (30 Aug 2019 11:55)  T(F): 103.2 (30 Aug 2019 11:55), Max: 103.2 (30 Aug 2019 11:55)  HR: 64 (30 Aug 2019 12:00) (60 - 79)  BP: 111/41 (30 Aug 2019 12:00) (79/39 - 118/43)  BP(mean): 59 (30 Aug 2019 12:00) (50 - 69)  RR: 29 (30 Aug 2019 12:00) (17 - 29)  SpO2: 94% (30 Aug 2019 12:00) (86% - 98%)  Daily     Daily Weight in k.8 (30 Aug 2019 07:55)    PE:    Constitutional: frail looking  HEENT: NC/AT, EOMI, PERRLA, conjunctivae clear; ears and nose atraumatic; pharynx clear  Neck: supple; thyroid not palpable  Back: no tenderness  Respiratory: respiratory effort normal; bilateral rhonchi  Cardiovascular: S1S2 regular, no murmurs  Abdomen: soft, not tender, not distended, positive BS; no liver or spleen organomegaly  Genitourinary: no suprapubic tenderness  Musculoskeletal: no muscle tenderness, no joint swelling or tenderness  Neurological/ Psychiatric: AxOx3, judgement and insight normal;  moving all extremities  Skin: no rashes; no palpable lesions    Labs: all available labs reviewed                        12.4   9.50  )-----------( 143      ( 30 Aug 2019 06:40 )             37.9     08-30    142  |  111<H>  |  31<H>  ----------------------------<  35<LL>  3.6   |  25  |  1.09    Ca    8.1<L>      30 Aug 2019 06:40    TPro  6.6  /  Alb  3.3  /  TBili  0.8  /  DBili  x   /  AST  36  /  ALT  16  /  AlkPhos  260<H>  08-29     LIVER FUNCTIONS - ( 29 Aug 2019 11:31 )  Alb: 3.3 g/dL / Pro: 6.6 gm/dL / ALK PHOS: 260 U/L / ALT: 16 U/L / AST: 36 U/L / GGT: x           Urinalysis Basic - ( 29 Aug 2019 11:31 )    Color: Yellow / Appearance: Clear / S.020 / pH: x  Gluc: x / Ketone: Trace  / Bili: Negative / Urobili: 1 mg/dL   Blood: x / Protein: 100 mg/dL / Nitrite: Negative   Leuk Esterase: Trace / RBC: 0-2 /HPF / WBC 0-2   Sq Epi: x / Non Sq Epi: Moderate / Bacteria: Few      < from: CT Angio Chest PE Protocol w/ IV Cont (19 @ 13:39) >    EXAM:  CTA CHEST PE PROTOCOL (W)AW IC                            PROCEDURE DATE:  2019          INTERPRETATION:  CTA chest dated 2019.    COMPARISON: 2/15/2019.    CLINICAL INFORMATION: Hypoxia.    TECHNIQUE: Contiguous axial 1.25 mm slice thickness images of the chest   were obtained after intravenous contrast administration utilizing PE   protocol.    90 mls of Omnipaque 350 was administered intravenously without   complication and 10 mls were discarded.    FINDINGS:    The airway is patent showing normal caliber and contour.  Consolidation of the posterior medial segment of the left lower lobe and   some infiltrates in the right lung base and some infiltrates and/or   atelectatic changes in the lingula.  There are  small bilateral pleural effusions. Findings are likely   indicative of multifocal pneumonia, for which clinical correlation is   recommended.    The mediastinum great vessels , ectasia of the ascending thoracic aorta,   4 cm in the AP diameter.There are no pulmonary arterial filling defects   to suggest pulmonary embolism.    Development of some  subcarinal lymphadenopathy, the summation of 2.1 x 2   x 2.5 cm in diameters.     The heart is mildly enlarged.    A limited evaluation of the upper abdomen, a vague 8 mm hypodensity in   the right lobe of the liver is unchanged. A 2.5 mm hypodense lesion in   the left lobe is unchanged. A 9 mm hypodense lesion in segment 5. These   lesions cannot be accurately characterized due to their early arterial   phase of contrast injection.    The bones , dorsal kyphosis. Increased  density of vertebral body of T12   and T7 and some hypertrophic changes in the thoracic spine. Correlation   with a  bone scan is recommended.    IMPRESSION:  No evidence of pulmonary embolism.   Multifocal pneumonia is the consideration and clinical correlation.   Associated small bilateral pleural effusions.  Other findings as above.      < end of copied text >      Radiology: all available radiological tests reviewed    Advanced directives addressed: full resuscitation

## 2019-08-30 NOTE — CONSULT NOTE ADULT - SUBJECTIVE AND OBJECTIVE BOX
Patient is a 89y old  Male who presents with a chief complaint of cough       HPI:  89 y/o male with a PMHx of Alzheimer's, CKD, Dementia, Depression, DM, HTN, HLD, presents to the ED BIBA from [facility] c/o shortness of breath. Pt was sent to ED for lethargy, generalized weakness, SOB, and productive cough.   Pt was found to have multifocal pna on CTA with no evidence of PE.   Cardiology team consulted for evaluation of the elevated troponins.    Pt this am comfortable in bed.   Denies any symptoms.  He is only oriented to self. 	    PAST MEDICAL/SURGICAL/FAMILY/SOCIAL HISTORY:    Past Medical History:  CKD (chronic kidney disease), stage IV    Dementia  Alzheimer's  Depression    Diabetes  Insulin dependent  HTN (hypertension)    Hyperlipidemia.     Past Surgical History:  History of tonsillectomy.    Social hx- no recent smoking hx      PAST MEDICAL & SURGICAL HISTORY:  CKD (chronic kidney disease), stage IV  Depression  Hyperlipidemia  HTN (hypertension)  Diabetes: Insulin dependent  Dementia: Alzheimer&#x27;s  History of tonsillectomy      MEDICATIONS  (STANDING):  aspirin  chewable 81 milliGRAM(s) Oral daily  cefepime  Injectable. 1000 milliGRAM(s) IV Push every 12 hours  citalopram 10 milliGRAM(s) Oral daily  dextrose 5%. 1000 milliLiter(s) (50 mL/Hr) IV Continuous <Continuous>  dextrose 50% Injectable 12.5 Gram(s) IV Push once  docusate sodium 100 milliGRAM(s) Oral daily  heparin  Injectable 5000 Unit(s) SubCutaneous every 12 hours  insulin glargine Injectable (LANTUS) 20 Unit(s) SubCutaneous at bedtime  insulin lispro (HumaLOG) corrective regimen sliding scale   SubCutaneous three times a day before meals  pantoprazole    Tablet 40 milliGRAM(s) Oral before breakfast  simvastatin 10 milliGRAM(s) Oral at bedtime    MEDICATIONS  (PRN):  acetaminophen   Tablet .. 650 milliGRAM(s) Oral every 6 hours PRN Mild Pain (1 - 3)  aluminum hydroxide/magnesium hydroxide/simethicone Suspension 30 milliLiter(s) Oral every 4 hours PRN Dyspepsia  dextrose 40% Gel 15 Gram(s) Oral once PRN Blood Glucose LESS THAN 70 milliGRAM(s)/deciliter  glucagon  Injectable 1 milliGRAM(s) IntraMuscular once PRN Glucose LESS THAN 70 milligrams/deciliter  ondansetron Injectable 4 milliGRAM(s) IV Push every 6 hours PRN Nausea  senna 2 Tablet(s) Oral at bedtime PRN Constipation      REVIEW OF SYSTEMS:   CONSTITUTIONAL:    no fever or chills.   RESPIRATORY:  No cough.  No wheeze.  No hemoptysis.  c/o shortness of breath.  CARDIOVASCULAR:  No chest pains.  c/o shortness of breath  GASTROINTESTINAL:  No abdominal pain.  No nausea or vomiting.    MUSCULOSKELETAL:  No musculoskeletal pain.  No joint swelling.  No arthritis.          Vital Signs Last 24 Hrs  T(C): 36.1 (30 Aug 2019 05:20), Max: 37.7 (29 Aug 2019 11:50)  T(F): 97 (30 Aug 2019 05:20), Max: 99.9 (29 Aug 2019 11:50)  HR: 66 (30 Aug 2019 06:00) (60 - 79)  BP: 100/43 (30 Aug 2019 06:00) (79/39 - 114/51)  BP(mean): 56 (30 Aug 2019 06:00) (50 - 69)  RR: 24 (30 Aug 2019 06:00) (17 - 27)  SpO2: 86% (30 Aug 2019 06:00) (86% - 98%)    PHYSICAL EXAM-    Constitutional: ill looking elderly male in no acute distress    Head: Head is normocephalic and atraumatic.      Neck:  No JVD.     Cardiovascular: Regular rate and rhythm without S3, S4. No murmurs or rubs are appreciated.      Respiratory: Breath sounds are normal. No rales. No wheezing.    Abdomen: Soft, nontender, nondistended with positive bowel sounds.      Extremity: No tenderness. No  pitting edema     Neurologic: The patient is alert and oriented.      Skin: No rash, no obvious lesions noted.      Psychiatric: The patient appears to be emotionally stable.      INTERPRETATION OF TELEMETRY: sinus rythm.     ECG: Sinus rythm , normal axis, T wave inversion in V5-6.  I&O's Detail      LABS:                        12.4   9.50  )-----------( 143      ( 30 Aug 2019 06:40 )             37.9     08-29    142  |  107  |  34<H>  ----------------------------<  138<H>  4.0   |  27  |  1.46<H>    Ca    8.6      29 Aug 2019 11:31    TPro  6.6  /  Alb  3.3  /  TBili  0.8  /  DBili  x   /  AST  36  /  ALT  16  /  AlkPhos  260<H>      CARDIAC MARKERS ( 29 Aug 2019 19:44 )  0.037 ng/mL / x     / x     / x     / x      CARDIAC MARKERS ( 29 Aug 2019 14:13 )  0.047 ng/mL / x     / x     / x     / x      CARDIAC MARKERS ( 29 Aug 2019 11:31 )  0.048 ng/mL / x     / x     / x     / x          PT/INR - ( 29 Aug 2019 11:31 )   PT: 13.3 sec;   INR: 1.19 ratio         PTT - ( 29 Aug 2019 11:31 )  PTT:31.2 sec  Urinalysis Basic - ( 29 Aug 2019 11:31 )    Color: Yellow / Appearance: Clear / S.020 / pH: x  Gluc: x / Ketone: Trace  / Bili: Negative / Urobili: 1 mg/dL   Blood: x / Protein: 100 mg/dL / Nitrite: Negative   Leuk Esterase: Trace / RBC: 0-2 /HPF / WBC 0-2   Sq Epi: x / Non Sq Epi: Moderate / Bacteria: Few      I&O's Summary    BNPSerum Pro-Brain Natriuretic Peptide: 1966 pg/mL ( @ 11:31)    RADIOLOGY & ADDITIONAL STUDIES:  < from: CT Angio Chest PE Protocol w/ IV Cont (19 @ 13:39) >    IMPRESSION:  No evidence of pulmonary embolism.   Multifocal pneumonia is the consideration and clinical correlation.   Associated small bilateral pleural effusions.  Other findings as above.                HANG SANCHEZ M.D., ATTENDING RADIOLOGIST  This document has been electronically signed. Aug 29 2019  2:02PM    < end of copied text >

## 2019-08-30 NOTE — CONSULT NOTE ADULT - ASSESSMENT
SOB- Pneumonia likely etiology.  Pt appears euvolemic on physical exam.  Rest of management per primary team.    Elevated cardiac enzymes- Cardiac enzymes are mildly elevated and almost in the same range.  This could likely be demand from ongoing clinical noncardiac issues.  Close monitoring recommended for now and pt to f/u with cardiologist after discharge from the hospital as outpt.   Unclear if he had any ischemic workup in past.  He is elderly with multiple comorbidities.     HTN- BP on the low normal end this am. Recommend gentle iv hydration.     Other medical issues- Management per primary team.   Thank you for allowing me to participate in the care of this patient. Please feel free to contact me with any questions.

## 2019-08-31 RX ADMIN — HEPARIN SODIUM 5000 UNIT(S): 5000 INJECTION INTRAVENOUS; SUBCUTANEOUS at 05:24

## 2019-08-31 RX ADMIN — SIMVASTATIN 10 MILLIGRAM(S): 20 TABLET, FILM COATED ORAL at 20:56

## 2019-08-31 RX ADMIN — HEPARIN SODIUM 5000 UNIT(S): 5000 INJECTION INTRAVENOUS; SUBCUTANEOUS at 12:38

## 2019-08-31 RX ADMIN — CEFEPIME 1000 MILLIGRAM(S): 1 INJECTION, POWDER, FOR SOLUTION INTRAMUSCULAR; INTRAVENOUS at 05:24

## 2019-08-31 RX ADMIN — CITALOPRAM 10 MILLIGRAM(S): 10 TABLET, FILM COATED ORAL at 12:38

## 2019-08-31 RX ADMIN — Medication 2: at 11:18

## 2019-08-31 RX ADMIN — Medication 81 MILLIGRAM(S): at 12:38

## 2019-08-31 RX ADMIN — Medication 650 MILLIGRAM(S): at 15:10

## 2019-08-31 RX ADMIN — CEFEPIME 1000 MILLIGRAM(S): 1 INJECTION, POWDER, FOR SOLUTION INTRAMUSCULAR; INTRAVENOUS at 17:02

## 2019-08-31 RX ADMIN — Medication 1: at 17:01

## 2019-08-31 RX ADMIN — PANTOPRAZOLE SODIUM 40 MILLIGRAM(S): 20 TABLET, DELAYED RELEASE ORAL at 05:25

## 2019-08-31 RX ADMIN — Medication 650 MILLIGRAM(S): at 15:24

## 2019-09-01 RX ORDER — INSULIN GLARGINE 100 [IU]/ML
10 INJECTION, SOLUTION SUBCUTANEOUS AT BEDTIME
Refills: 0 | Status: DISCONTINUED | OUTPATIENT
Start: 2019-09-01 | End: 2019-09-05

## 2019-09-01 RX ADMIN — Medication 2: at 17:50

## 2019-09-01 RX ADMIN — CITALOPRAM 10 MILLIGRAM(S): 10 TABLET, FILM COATED ORAL at 09:30

## 2019-09-01 RX ADMIN — PANTOPRAZOLE SODIUM 40 MILLIGRAM(S): 20 TABLET, DELAYED RELEASE ORAL at 05:54

## 2019-09-01 RX ADMIN — HEPARIN SODIUM 5000 UNIT(S): 5000 INJECTION INTRAVENOUS; SUBCUTANEOUS at 05:54

## 2019-09-01 RX ADMIN — CEFEPIME 1000 MILLIGRAM(S): 1 INJECTION, POWDER, FOR SOLUTION INTRAMUSCULAR; INTRAVENOUS at 17:50

## 2019-09-01 RX ADMIN — CEFEPIME 1000 MILLIGRAM(S): 1 INJECTION, POWDER, FOR SOLUTION INTRAMUSCULAR; INTRAVENOUS at 05:54

## 2019-09-01 RX ADMIN — Medication 81 MILLIGRAM(S): at 09:31

## 2019-09-01 RX ADMIN — SIMVASTATIN 10 MILLIGRAM(S): 20 TABLET, FILM COATED ORAL at 21:12

## 2019-09-01 RX ADMIN — Medication 1: at 12:36

## 2019-09-01 RX ADMIN — Medication 650 MILLIGRAM(S): at 21:12

## 2019-09-01 RX ADMIN — INSULIN GLARGINE 10 UNIT(S): 100 INJECTION, SOLUTION SUBCUTANEOUS at 21:12

## 2019-09-01 RX ADMIN — Medication 1: at 09:29

## 2019-09-01 RX ADMIN — Medication 100 MILLIGRAM(S): at 09:31

## 2019-09-01 RX ADMIN — HEPARIN SODIUM 5000 UNIT(S): 5000 INJECTION INTRAVENOUS; SUBCUTANEOUS at 17:52

## 2019-09-02 LAB
ANION GAP SERPL CALC-SCNC: 6 MMOL/L — SIGNIFICANT CHANGE UP (ref 5–17)
BUN SERPL-MCNC: 20 MG/DL — SIGNIFICANT CHANGE UP (ref 7–23)
CALCIUM SERPL-MCNC: 8.5 MG/DL — SIGNIFICANT CHANGE UP (ref 8.5–10.1)
CHLORIDE SERPL-SCNC: 113 MMOL/L — HIGH (ref 96–108)
CO2 SERPL-SCNC: 27 MMOL/L — SIGNIFICANT CHANGE UP (ref 22–31)
CREAT SERPL-MCNC: 1.03 MG/DL — SIGNIFICANT CHANGE UP (ref 0.5–1.3)
GLUCOSE SERPL-MCNC: 135 MG/DL — HIGH (ref 70–99)
POTASSIUM SERPL-MCNC: 3.6 MMOL/L — SIGNIFICANT CHANGE UP (ref 3.5–5.3)
POTASSIUM SERPL-SCNC: 3.6 MMOL/L — SIGNIFICANT CHANGE UP (ref 3.5–5.3)
SODIUM SERPL-SCNC: 146 MMOL/L — HIGH (ref 135–145)

## 2019-09-02 RX ADMIN — PANTOPRAZOLE SODIUM 40 MILLIGRAM(S): 20 TABLET, DELAYED RELEASE ORAL at 06:01

## 2019-09-02 RX ADMIN — HEPARIN SODIUM 5000 UNIT(S): 5000 INJECTION INTRAVENOUS; SUBCUTANEOUS at 06:01

## 2019-09-02 RX ADMIN — SIMVASTATIN 10 MILLIGRAM(S): 20 TABLET, FILM COATED ORAL at 21:01

## 2019-09-02 RX ADMIN — CITALOPRAM 10 MILLIGRAM(S): 10 TABLET, FILM COATED ORAL at 12:45

## 2019-09-02 RX ADMIN — Medication 2: at 12:43

## 2019-09-02 RX ADMIN — Medication 81 MILLIGRAM(S): at 12:45

## 2019-09-02 RX ADMIN — Medication 1: at 17:22

## 2019-09-02 RX ADMIN — HEPARIN SODIUM 5000 UNIT(S): 5000 INJECTION INTRAVENOUS; SUBCUTANEOUS at 17:22

## 2019-09-02 RX ADMIN — CEFEPIME 1000 MILLIGRAM(S): 1 INJECTION, POWDER, FOR SOLUTION INTRAMUSCULAR; INTRAVENOUS at 17:22

## 2019-09-02 RX ADMIN — Medication 100 MILLIGRAM(S): at 12:45

## 2019-09-02 RX ADMIN — CEFEPIME 1000 MILLIGRAM(S): 1 INJECTION, POWDER, FOR SOLUTION INTRAMUSCULAR; INTRAVENOUS at 06:01

## 2019-09-02 RX ADMIN — Medication 650 MILLIGRAM(S): at 21:01

## 2019-09-02 RX ADMIN — Medication 650 MILLIGRAM(S): at 22:07

## 2019-09-02 RX ADMIN — INSULIN GLARGINE 10 UNIT(S): 100 INJECTION, SOLUTION SUBCUTANEOUS at 21:01

## 2019-09-02 NOTE — PHYSICAL THERAPY INITIAL EVALUATION ADULT - RANGE OF MOTION EXAMINATION, REHAB EVAL
except PROM - b/l DF to neutral, R shoulder elevation 0-80, L shoulder elevation 0-100/bilateral upper extremity ROM was WFL (within functional limits)/bilateral lower extremity ROM was WFL (within functional limits)

## 2019-09-02 NOTE — PHYSICAL THERAPY INITIAL EVALUATION ADULT - DIAGNOSIS, PT EVAL
Multifocal PNA /HCAp/GNR suspected + Sepsis with bandemia 2/2 to mulitfocal pna + Enterovirus/Rhinovirus:

## 2019-09-02 NOTE — PHYSICAL THERAPY INITIAL EVALUATION ADULT - PERTINENT HX OF CURRENT PROBLEM, REHAB EVAL
89 y/o male with a PMHx of Alzheimer's, CKD, Dementia, Depression, DM, HTN, HLD, presents to the ED BIBA from [facility] c/o shortness of breath. Pt was sent to ED for lethargy, generalized weakness, SOB, and productive cough.

## 2019-09-03 LAB
ANION GAP SERPL CALC-SCNC: 10 MMOL/L — SIGNIFICANT CHANGE UP (ref 5–17)
BUN SERPL-MCNC: 20 MG/DL — SIGNIFICANT CHANGE UP (ref 7–23)
CALCIUM SERPL-MCNC: 8.6 MG/DL — SIGNIFICANT CHANGE UP (ref 8.5–10.1)
CHLORIDE SERPL-SCNC: 110 MMOL/L — HIGH (ref 96–108)
CO2 SERPL-SCNC: 29 MMOL/L — SIGNIFICANT CHANGE UP (ref 22–31)
CREAT SERPL-MCNC: 1 MG/DL — SIGNIFICANT CHANGE UP (ref 0.5–1.3)
CULTURE RESULTS: SIGNIFICANT CHANGE UP
CULTURE RESULTS: SIGNIFICANT CHANGE UP
GLUCOSE SERPL-MCNC: 109 MG/DL — HIGH (ref 70–99)
HCT VFR BLD CALC: 38.9 % — LOW (ref 39–50)
HGB BLD-MCNC: 12.8 G/DL — LOW (ref 13–17)
MCHC RBC-ENTMCNC: 30.4 PG — SIGNIFICANT CHANGE UP (ref 27–34)
MCHC RBC-ENTMCNC: 32.9 GM/DL — SIGNIFICANT CHANGE UP (ref 32–36)
MCV RBC AUTO: 92.4 FL — SIGNIFICANT CHANGE UP (ref 80–100)
PLATELET # BLD AUTO: 180 K/UL — SIGNIFICANT CHANGE UP (ref 150–400)
POTASSIUM SERPL-MCNC: 3.8 MMOL/L — SIGNIFICANT CHANGE UP (ref 3.5–5.3)
POTASSIUM SERPL-SCNC: 3.8 MMOL/L — SIGNIFICANT CHANGE UP (ref 3.5–5.3)
RBC # BLD: 4.21 M/UL — SIGNIFICANT CHANGE UP (ref 4.2–5.8)
RBC # FLD: 14 % — SIGNIFICANT CHANGE UP (ref 10.3–14.5)
SODIUM SERPL-SCNC: 149 MMOL/L — HIGH (ref 135–145)
SPECIMEN SOURCE: SIGNIFICANT CHANGE UP
SPECIMEN SOURCE: SIGNIFICANT CHANGE UP
WBC # BLD: 7.74 K/UL — SIGNIFICANT CHANGE UP (ref 3.8–10.5)
WBC # FLD AUTO: 7.74 K/UL — SIGNIFICANT CHANGE UP (ref 3.8–10.5)

## 2019-09-03 RX ORDER — SODIUM CHLORIDE 9 MG/ML
1000 INJECTION, SOLUTION INTRAVENOUS
Refills: 0 | Status: DISCONTINUED | OUTPATIENT
Start: 2019-09-03 | End: 2019-09-05

## 2019-09-03 RX ADMIN — PANTOPRAZOLE SODIUM 40 MILLIGRAM(S): 20 TABLET, DELAYED RELEASE ORAL at 06:13

## 2019-09-03 RX ADMIN — CEFEPIME 1000 MILLIGRAM(S): 1 INJECTION, POWDER, FOR SOLUTION INTRAMUSCULAR; INTRAVENOUS at 18:28

## 2019-09-03 RX ADMIN — INSULIN GLARGINE 10 UNIT(S): 100 INJECTION, SOLUTION SUBCUTANEOUS at 22:21

## 2019-09-03 RX ADMIN — Medication 100 MILLIGRAM(S): at 13:18

## 2019-09-03 RX ADMIN — Medication 2: at 13:25

## 2019-09-03 RX ADMIN — SODIUM CHLORIDE 50 MILLILITER(S): 9 INJECTION, SOLUTION INTRAVENOUS at 13:18

## 2019-09-03 RX ADMIN — SODIUM CHLORIDE 50 MILLILITER(S): 9 INJECTION, SOLUTION INTRAVENOUS at 13:26

## 2019-09-03 RX ADMIN — CITALOPRAM 10 MILLIGRAM(S): 10 TABLET, FILM COATED ORAL at 13:23

## 2019-09-03 RX ADMIN — Medication 81 MILLIGRAM(S): at 13:25

## 2019-09-03 RX ADMIN — SIMVASTATIN 10 MILLIGRAM(S): 20 TABLET, FILM COATED ORAL at 22:21

## 2019-09-03 RX ADMIN — CEFEPIME 1000 MILLIGRAM(S): 1 INJECTION, POWDER, FOR SOLUTION INTRAMUSCULAR; INTRAVENOUS at 06:13

## 2019-09-03 RX ADMIN — HEPARIN SODIUM 5000 UNIT(S): 5000 INJECTION INTRAVENOUS; SUBCUTANEOUS at 06:13

## 2019-09-03 RX ADMIN — HEPARIN SODIUM 5000 UNIT(S): 5000 INJECTION INTRAVENOUS; SUBCUTANEOUS at 18:28

## 2019-09-03 NOTE — CHART NOTE - NSCHARTNOTEFT_GEN_A_CORE
HPI: 89 y/o male with a PMHx of Alzheimer's, CKD, Dementia, Depression, DM, HTN, HLD,     Patient noted to have non-painful rash on the left upper torso of unknown duration.  Has large area of erythema with appropriate blanching of the left upper torso posteriorly.  Does not follow a dermatomal distribution. There is no ulceration ,weeping pustules or fluctuance.  There is no warmth compared to surrounding tissue.     Skin rash  : appears to be pressure/heat induced .  Plan reposition frequently and observe. Reexamine if any changes occur      1) Multifocal PNA /HCAP/GNR suspected + Sepsis with bandemia   2) Hypoglycemia:   3) VANESSA/ATN   4) DM:   5) HTN/hyperlipidemia/Dementia:  7) DVT PPX: heparin s/q  8) Rash

## 2019-09-04 LAB
ANION GAP SERPL CALC-SCNC: 9 MMOL/L — SIGNIFICANT CHANGE UP (ref 5–17)
BUN SERPL-MCNC: 17 MG/DL — SIGNIFICANT CHANGE UP (ref 7–23)
CALCIUM SERPL-MCNC: 8.7 MG/DL — SIGNIFICANT CHANGE UP (ref 8.5–10.1)
CHLORIDE SERPL-SCNC: 110 MMOL/L — HIGH (ref 96–108)
CO2 SERPL-SCNC: 28 MMOL/L — SIGNIFICANT CHANGE UP (ref 22–31)
CREAT SERPL-MCNC: 0.9 MG/DL — SIGNIFICANT CHANGE UP (ref 0.5–1.3)
GLUCOSE SERPL-MCNC: 86 MG/DL — SIGNIFICANT CHANGE UP (ref 70–99)
POTASSIUM SERPL-MCNC: 3.9 MMOL/L — SIGNIFICANT CHANGE UP (ref 3.5–5.3)
POTASSIUM SERPL-SCNC: 3.9 MMOL/L — SIGNIFICANT CHANGE UP (ref 3.5–5.3)
SODIUM SERPL-SCNC: 147 MMOL/L — HIGH (ref 135–145)

## 2019-09-04 RX ADMIN — SIMVASTATIN 10 MILLIGRAM(S): 20 TABLET, FILM COATED ORAL at 21:57

## 2019-09-04 RX ADMIN — CEFEPIME 1000 MILLIGRAM(S): 1 INJECTION, POWDER, FOR SOLUTION INTRAMUSCULAR; INTRAVENOUS at 06:24

## 2019-09-04 RX ADMIN — CITALOPRAM 10 MILLIGRAM(S): 10 TABLET, FILM COATED ORAL at 08:25

## 2019-09-04 RX ADMIN — CEFEPIME 1000 MILLIGRAM(S): 1 INJECTION, POWDER, FOR SOLUTION INTRAMUSCULAR; INTRAVENOUS at 17:33

## 2019-09-04 RX ADMIN — HEPARIN SODIUM 5000 UNIT(S): 5000 INJECTION INTRAVENOUS; SUBCUTANEOUS at 06:24

## 2019-09-04 RX ADMIN — Medication 81 MILLIGRAM(S): at 08:25

## 2019-09-04 RX ADMIN — Medication 2: at 12:26

## 2019-09-04 RX ADMIN — PANTOPRAZOLE SODIUM 40 MILLIGRAM(S): 20 TABLET, DELAYED RELEASE ORAL at 06:24

## 2019-09-04 RX ADMIN — HEPARIN SODIUM 5000 UNIT(S): 5000 INJECTION INTRAVENOUS; SUBCUTANEOUS at 17:33

## 2019-09-04 RX ADMIN — Medication 100 MILLIGRAM(S): at 08:25

## 2019-09-04 RX ADMIN — INSULIN GLARGINE 10 UNIT(S): 100 INJECTION, SOLUTION SUBCUTANEOUS at 21:57

## 2019-09-04 NOTE — PROGRESS NOTE ADULT - PROVIDER SPECIALTY LIST ADULT
Hospitalist
Infectious Disease
Hospitalist
Infectious Disease
Infectious Disease

## 2019-09-04 NOTE — PROGRESS NOTE ADULT - SUBJECTIVE AND OBJECTIVE BOX
HPI: 89 y/o male with a PMHx of Alzheimer's, CKD, Dementia, Depression, DM, HTN, HLD, presents to the ED BIBA from [facility] c/o shortness of breath. Pt was sent to ED for lethargy, generalized weakness, SOB, and productive cough. Pt presents afebrile in the ED. PCP: Dr. Mason. Full HPI is unobtainable due to pt being a poor historian. History obtained from chart as patient unable to given any collateral info.    : multifocal PNA  FS down to 36, better after D10 drip      PHYSICAL EXAM:    Daily     Daily Weight in k.8 (30 Aug 2019 07:55)    ICU Vital Signs Last 24 Hrs  T(C): 39 (30 Aug 2019 14:00), Max: 39.6 (30 Aug 2019 11:55)  T(F): 102.2 (30 Aug 2019 14:00), Max: 103.2 (30 Aug 2019 11:55)  HR: 56 (30 Aug 2019 15:00) (56 - 79)  BP: 92/36 (30 Aug 2019 15:00) (79/39 - 118/43)  BP(mean): 50 (30 Aug 2019 15:00) (50 - 69)  ABP: --  ABP(mean): --  RR: 21 (30 Aug 2019 15:00) (19 - 29)  SpO2: 96% (30 Aug 2019 15:00) (86% - 98%)      Constitutional: Weak and ill appearing  HEENT: Atraumatic, CHELSIE, Normal, No congestion  Respiratory: Breath Sounds normal, no rhonchi/wheeze  Cardiovascular: N S1S2; LATANYA present  Gastrointestinal: Abdomen soft, non tender, Bowel Sounds present  Extremities: No edema, peripheral pulses present  Neurological: AAO x 0, no gross focal motor deficits  Skin: Non cellulitic, no rash, ulcers  Lymph Nodes: No lymphadenopathy noted  Back: No CVA tenderness   Musculoskeletal: non tender  Breasts: Deferred  Genitourinary: deferred  Rectal: Deferred                          12.4   9.50  )-----------( 143      ( 30 Aug 2019 06:40 )             37.9       CBC Full  -  ( 30 Aug 2019 06:40 )  WBC Count : 9.50 K/uL  RBC Count : 4.11 M/uL  Hemoglobin : 12.4 g/dL  Hematocrit : 37.9 %  Platelet Count - Automated : 143 K/uL  Mean Cell Volume : 92.2 fl  Mean Cell Hemoglobin : 30.2 pg  Mean Cell Hemoglobin Concentration : 32.7 gm/dL  Auto Neutrophil # : 7.70 K/uL  Auto Lymphocyte # : 1.33 K/uL  Auto Monocyte # : 0.38 K/uL  Auto Eosinophil # : 0.00 K/uL  Auto Basophil # : 0.10 K/uL  Auto Neutrophil % : 74.0 %  Auto Lymphocyte % : 14.0 %  Auto Monocyte % : 4.0 %  Auto Eosinophil % : 0.0 %  Auto Basophil % : 1.0 %          142  |  111<H>  |  31<H>  ----------------------------<  35<LL>  3.6   |  25  |  1.09    Ca    8.1<L>      30 Aug 2019 06:40    TPro  6.6  /  Alb  3.3  /  TBili  0.8  /  DBili  x   /  AST  36  /  ALT  16  /  AlkPhos  260<H>        LIVER FUNCTIONS - ( 29 Aug 2019 11:31 )  Alb: 3.3 g/dL / Pro: 6.6 gm/dL / ALK PHOS: 260 U/L / ALT: 16 U/L / AST: 36 U/L / GGT: x             PT/INR - ( 29 Aug 2019 11:31 )   PT: 13.3 sec;   INR: 1.19 ratio         PTT - ( 29 Aug 2019 11:31 )  PTT:31.2 sec    CARDIAC MARKERS ( 29 Aug 2019 19:44 )  0.037 ng/mL / x     / x     / x     / x      CARDIAC MARKERS ( 29 Aug 2019 14:13 )  0.047 ng/mL / x     / x     / x     / x      CARDIAC MARKERS ( 29 Aug 2019 11:31 )  0.048 ng/mL / x     / x     / x     / x            Urinalysis Basic - ( 29 Aug 2019 11:31 )    Color: Yellow / Appearance: Clear / S.020 / pH: x  Gluc: x / Ketone: Trace  / Bili: Negative / Urobili: 1 mg/dL   Blood: x / Protein: 100 mg/dL / Nitrite: Negative   Leuk Esterase: Trace / RBC: 0-2 /HPF / WBC 0-2   Sq Epi: x / Non Sq Epi: Moderate / Bacteria: Few            MEDICATIONS  (STANDING):  aspirin  chewable 81 milliGRAM(s) Oral daily  cefepime  Injectable. 1000 milliGRAM(s) IV Push every 12 hours  citalopram 10 milliGRAM(s) Oral daily  dextrose 10%. 1000 milliLiter(s) (35 mL/Hr) IV Continuous <Continuous>  dextrose 5%. 1000 milliLiter(s) (50 mL/Hr) IV Continuous <Continuous>  dextrose 50% Injectable 12.5 Gram(s) IV Push once  docusate sodium 100 milliGRAM(s) Oral daily  heparin  Injectable 5000 Unit(s) SubCutaneous every 12 hours  insulin lispro (HumaLOG) corrective regimen sliding scale   SubCutaneous three times a day before meals  pantoprazole    Tablet 40 milliGRAM(s) Oral before breakfast  simvastatin 10 milliGRAM(s) Oral at bedtime
Patient is a 89y old  Male who presents with a chief complaint of cough (30 Aug 2019 07:06)    Date of service: 09-02-19 @ 08:53    Patient sitting in bed; still with cough  Afebrile this am; low grade fever last evening        ROS unable to obtain secondary to patient medical condition     MEDICATIONS  (STANDING):  aspirin  chewable 81 milliGRAM(s) Oral daily  cefepime  Injectable. 1000 milliGRAM(s) IV Push every 12 hours  citalopram 10 milliGRAM(s) Oral daily  dextrose 5%. 1000 milliLiter(s) (50 mL/Hr) IV Continuous <Continuous>  dextrose 50% Injectable 12.5 Gram(s) IV Push once  docusate sodium 100 milliGRAM(s) Oral daily  heparin  Injectable 5000 Unit(s) SubCutaneous every 12 hours  insulin glargine Injectable (LANTUS) 10 Unit(s) SubCutaneous at bedtime  insulin lispro (HumaLOG) corrective regimen sliding scale   SubCutaneous three times a day before meals  pantoprazole    Tablet 40 milliGRAM(s) Oral before breakfast  simvastatin 10 milliGRAM(s) Oral at bedtime    MEDICATIONS  (PRN):  acetaminophen   Tablet .. 650 milliGRAM(s) Oral every 6 hours PRN Mild Pain (1 - 3)  aluminum hydroxide/magnesium hydroxide/simethicone Suspension 30 milliLiter(s) Oral every 4 hours PRN Dyspepsia  dextrose 40% Gel 15 Gram(s) Oral once PRN Blood Glucose LESS THAN 70 milliGRAM(s)/deciliter  glucagon  Injectable 1 milliGRAM(s) IntraMuscular once PRN Glucose LESS THAN 70 milligrams/deciliter  ondansetron Injectable 4 milliGRAM(s) IV Push every 6 hours PRN Nausea  senna 2 Tablet(s) Oral at bedtime PRN Constipation      Vital Signs Last 24 Hrs  T(C): 37.4 (01 Sep 2019 23:11), Max: 37.8 (01 Sep 2019 19:23)  T(F): 99.4 (01 Sep 2019 23:11), Max: 100.1 (01 Sep 2019 19:23)  HR: 66 (02 Sep 2019 06:05) (49 - 66)  BP: 146/48 (02 Sep 2019 06:05) (105/47 - 146/48)  BP(mean): 73 (02 Sep 2019 06:05) (62 - 73)  RR: 18 (02 Sep 2019 06:05) (17 - 23)  SpO2: 94% (02 Sep 2019 06:05) (87% - 94%)    Physical Exam:        Physical Exam:    PE:    Constitutional: frail looking  HEENT: NC/AT, EOMI, PERRLA, conjunctivae clear; ears and nose atraumatic; pharynx clear  Neck: supple; thyroid not palpable  Back: no tenderness  Respiratory: respiratory effort normal; bilateral rhonchi  Cardiovascular: S1S2 regular, no murmurs  Abdomen: soft, not tender, not distended, positive BS; no liver or spleen organomegaly  Genitourinary: no suprapubic tenderness  Musculoskeletal: no muscle tenderness, no joint swelling or tenderness  Neurological/ Psychiatric: AxOx3, judgement and insight normal;  moving all extremities  Skin: no rashes; no palpable lesions    Labs: all available labs reviewed                      Labs:    09-02    146<H>  |  113<H>  |  20  ----------------------------<  135<H>  3.6   |  27  |  1.03    Ca    8.5      02 Sep 2019 06:16             Cultures:       Culture - Blood (collected 08-29-19 @ 11:35)  Source: .Blood Blood-Peripheral  Preliminary Report (08-30-19 @ 16:01):    No growth to date.    Culture - Urine (collected 08-29-19 @ 11:31)  Source: .Urine Clean Catch (Midstream)  Final Report (08-30-19 @ 12:59):    No growth    Culture - Blood (collected 08-29-19 @ 11:31)  Source: .Blood Blood-Peripheral  Preliminary Report (08-30-19 @ 16:01):    No growth to date.                < from: CT Angio Chest PE Protocol w/ IV Cont (08.29.19 @ 13:39) >    EXAM:  CTA CHEST PE PROTOCOL (W)AW IC                            PROCEDURE DATE:  08/29/2019          INTERPRETATION:  CTA chest dated 8/29/2019.    COMPARISON: 2/15/2019.    CLINICAL INFORMATION: Hypoxia.    TECHNIQUE: Contiguous axial 1.25 mm slice thickness images of the chest   were obtained after intravenous contrast administration utilizing PE   protocol.    90 mls of Omnipaque 350 was administered intravenously without   complication and 10 mls were discarded.    FINDINGS:    The airway is patent showing normal caliber and contour.  Consolidation of the posterior medial segment of the left lower lobe and   some infiltrates in the right lung base and some infiltrates and/or   atelectatic changes in the lingula.  There are  small bilateral pleural effusions. Findings are likely   indicative of multifocal pneumonia, for which clinical correlation is   recommended.    The mediastinum great vessels , ectasia of the ascending thoracic aorta,   4 cm in the AP diameter.There are no pulmonary arterial filling defects   to suggest pulmonary embolism.    Development of some  subcarinal lymphadenopathy, the summation of 2.1 x 2   x 2.5 cm in diameters.     The heart is mildly enlarged.    A limited evaluation of the upper abdomen, a vague 8 mm hypodensity in   the right lobe of the liver is unchanged. A 2.5 mm hypodense lesion in   the left lobe is unchanged. A 9 mm hypodense lesion in segment 5. These   lesions cannot be accurately characterized due to their early arterial   phase of contrast injection.    The bones , dorsal kyphosis. Increased  density of vertebral body of T12   and T7 and some hypertrophic changes in the thoracic spine. Correlation   with a  bone scan is recommended.    IMPRESSION:  No evidence of pulmonary embolism.   Multifocal pneumonia is the consideration and clinical correlation.   Associated small bilateral pleural effusions.  Other findings as above.      < end of copied text >      Radiology: all available radiological tests reviewed    Advanced directives addressed: full resuscitation
Patient is a 89y old  Male who presents with a chief complaint of cough (30 Aug 2019 07:06)    Date of service: 09-03-19 @ 12:08    Patient lying in bed  Still with cough        ROS unable to obtain secondary to patient medical condition     MEDICATIONS  (STANDING):  aspirin  chewable 81 milliGRAM(s) Oral daily  cefepime  Injectable. 1000 milliGRAM(s) IV Push every 12 hours  citalopram 10 milliGRAM(s) Oral daily  dextrose 5%. 1000 milliLiter(s) (50 mL/Hr) IV Continuous <Continuous>  dextrose 50% Injectable 12.5 Gram(s) IV Push once  docusate sodium 100 milliGRAM(s) Oral daily  heparin  Injectable 5000 Unit(s) SubCutaneous every 12 hours  insulin glargine Injectable (LANTUS) 10 Unit(s) SubCutaneous at bedtime  insulin lispro (HumaLOG) corrective regimen sliding scale   SubCutaneous three times a day before meals  pantoprazole    Tablet 40 milliGRAM(s) Oral before breakfast  simvastatin 10 milliGRAM(s) Oral at bedtime  sodium chloride 0.45%. 1000 milliLiter(s) (50 mL/Hr) IV Continuous <Continuous>    MEDICATIONS  (PRN):  acetaminophen   Tablet .. 650 milliGRAM(s) Oral every 6 hours PRN Mild Pain (1 - 3)  aluminum hydroxide/magnesium hydroxide/simethicone Suspension 30 milliLiter(s) Oral every 4 hours PRN Dyspepsia  dextrose 40% Gel 15 Gram(s) Oral once PRN Blood Glucose LESS THAN 70 milliGRAM(s)/deciliter  glucagon  Injectable 1 milliGRAM(s) IntraMuscular once PRN Glucose LESS THAN 70 milligrams/deciliter  ondansetron Injectable 4 milliGRAM(s) IV Push every 6 hours PRN Nausea  senna 2 Tablet(s) Oral at bedtime PRN Constipation      Vital Signs Last 24 Hrs  T(C): 37.4 (03 Sep 2019 10:29), Max: 38 (02 Sep 2019 19:53)  T(F): 99.3 (03 Sep 2019 10:29), Max: 100.4 (02 Sep 2019 19:53)  HR: --  BP: 134/45 (03 Sep 2019 08:00) (122/43 - 134/45)  BP(mean): 68 (03 Sep 2019 08:00) (54 - 68)  RR: --  SpO2: 96% (03 Sep 2019 00:05) (94% - 96%)    Physical Exam:          PE:    Constitutional: frail looking  HEENT: NC/AT, EOMI, PERRLA, conjunctivae clear; ears and nose atraumatic; pharynx clear  Neck: supple; thyroid not palpable  Back: no tenderness  Respiratory: respiratory effort normal; bilateral rhonchi  Cardiovascular: S1S2 regular, no murmurs  Abdomen: soft, not tender, not distended, positive BS; no liver or spleen organomegaly  Genitourinary: no suprapubic tenderness  Musculoskeletal: no muscle tenderness, no joint swelling or tenderness  Neurological/ Psychiatric: AxOx3, judgement and insight normal;  moving all extremities  Skin: no rashes; no palpable lesions    Labs: all available labs reviewed                      Labs:    09-02    146<H>  |  113<H>  |  20  ----------------------------<  135<H>  3.6   |  27  |  1.03    Ca    8.5      02 Sep 2019 06:16             Cultures:       Culture - Blood (collected 08-29-19 @ 11:35)  Source: .Blood Blood-Peripheral  Preliminary Report (08-30-19 @ 16:01):    No growth to date.    Culture - Urine (collected 08-29-19 @ 11:31)  Source: .Urine Clean Catch (Midstream)  Final Report (08-30-19 @ 12:59):    No growth    Culture - Blood (collected 08-29-19 @ 11:31)  Source: .Blood Blood-Peripheral  Preliminary Report (08-30-19 @ 16:01):    No growth to date.                < from: CT Angio Chest PE Protocol w/ IV Cont (08.29.19 @ 13:39) >    EXAM:  CTA CHEST PE PROTOCOL (W)AW IC                            PROCEDURE DATE:  08/29/2019          INTERPRETATION:  CTA chest dated 8/29/2019.    COMPARISON: 2/15/2019.    CLINICAL INFORMATION: Hypoxia.    TECHNIQUE: Contiguous axial 1.25 mm slice thickness images of the chest   were obtained after intravenous contrast administration utilizing PE   protocol.    90 mls of Omnipaque 350 was administered intravenously without   complication and 10 mls were discarded.    FINDINGS:    The airway is patent showing normal caliber and contour.  Consolidation of the posterior medial segment of the left lower lobe and   some infiltrates in the right lung base and some infiltrates and/or   atelectatic changes in the lingula.  There are  small bilateral pleural effusions. Findings are likely   indicative of multifocal pneumonia, for which clinical correlation is   recommended.    The mediastinum great vessels , ectasia of the ascending thoracic aorta,   4 cm in the AP diameter.There are no pulmonary arterial filling defects   to suggest pulmonary embolism.    Development of some  subcarinal lymphadenopathy, the summation of 2.1 x 2   x 2.5 cm in diameters.     The heart is mildly enlarged.    A limited evaluation of the upper abdomen, a vague 8 mm hypodensity in   the right lobe of the liver is unchanged. A 2.5 mm hypodense lesion in   the left lobe is unchanged. A 9 mm hypodense lesion in segment 5. These   lesions cannot be accurately characterized due to their early arterial   phase of contrast injection.    The bones , dorsal kyphosis. Increased  density of vertebral body of T12   and T7 and some hypertrophic changes in the thoracic spine. Correlation   with a  bone scan is recommended.    IMPRESSION:  No evidence of pulmonary embolism.   Multifocal pneumonia is the consideration and clinical correlation.   Associated small bilateral pleural effusions.  Other findings as above.      < end of copied text >      Radiology: all available radiological tests reviewed    Advanced directives addressed: full resuscitation
HOSPITALIST ATTENDING PROGRESS NOTE    Chart and meds reviewed.  Patient seen and examined.    HPI: 89 y/o male with a PMHx of Alzheimer's, CKD, Dementia, Depression, DM, HTN, HLD, presents to the ED BIBA from [facility] c/o shortness of breath. Pt was sent to ED for lethargy, generalized weakness, SOB, and productive cough. Pt presents afebrile in the ED. PCP: Dr. Mason. Full HPI is unobtainable due to pt being a poor historian. History obtained from chart as patient unable to given any collateral info.    9/4/19 pt seen and examined, no distress.     All 10 systems reviewed and found to be negative with the exception of what has been described above.    MEDICATIONS  (STANDING):  aspirin  chewable 81 milliGRAM(s) Oral daily  cefepime  Injectable. 1000 milliGRAM(s) IV Push every 12 hours  citalopram 10 milliGRAM(s) Oral daily  dextrose 5%. 1000 milliLiter(s) (50 mL/Hr) IV Continuous <Continuous>  dextrose 50% Injectable 12.5 Gram(s) IV Push once  docusate sodium 100 milliGRAM(s) Oral daily  heparin  Injectable 5000 Unit(s) SubCutaneous every 12 hours  insulin glargine Injectable (LANTUS) 10 Unit(s) SubCutaneous at bedtime  insulin lispro (HumaLOG) corrective regimen sliding scale   SubCutaneous three times a day before meals  pantoprazole    Tablet 40 milliGRAM(s) Oral before breakfast  simvastatin 10 milliGRAM(s) Oral at bedtime  sodium chloride 0.45%. 1000 milliLiter(s) (50 mL/Hr) IV Continuous <Continuous>    MEDICATIONS  (PRN):  acetaminophen   Tablet .. 650 milliGRAM(s) Oral every 6 hours PRN Mild Pain (1 - 3)  aluminum hydroxide/magnesium hydroxide/simethicone Suspension 30 milliLiter(s) Oral every 4 hours PRN Dyspepsia  dextrose 40% Gel 15 Gram(s) Oral once PRN Blood Glucose LESS THAN 70 milliGRAM(s)/deciliter  glucagon  Injectable 1 milliGRAM(s) IntraMuscular once PRN Glucose LESS THAN 70 milligrams/deciliter  ondansetron Injectable 4 milliGRAM(s) IV Push every 6 hours PRN Nausea  senna 2 Tablet(s) Oral at bedtime PRN Constipation      VITALS:  T(F): 97.9 (09-04-19 @ 14:43), Max: 99.2 (09-04-19 @ 05:18)  HR: 51 (09-04-19 @ 14:43) (51 - 51)  BP: 119/47 (09-04-19 @ 14:43) (119/47 - 141/61)  RR: 18 (09-04-19 @ 14:43) (18 - 18)  SpO2: 95% (09-04-19 @ 14:43) (93% - 98%)  Wt(kg): --    I&O's Summary    03 Sep 2019 07:01  -  04 Sep 2019 07:00  --------------------------------------------------------  IN: 0 mL / OUT: 125 mL / NET: -125 mL        CAPILLARY BLOOD GLUCOSE      POCT Blood Glucose.: 208 mg/dL (04 Sep 2019 12:17)  POCT Blood Glucose.: 174 mg/dL (03 Sep 2019 22:20)      PHYSICAL EXAM:    HEENT:  pupils equal and reactive, EOMI, no oropharyngeal lesions, erythema, exudates, oral thrush  NECK:   supple, no carotid bruits, no palpable lymph nodes, no thyromegaly  CV:  +S1, +S2, regular, no murmurs or rubs  RESP:   lungs clear to auscultation bilaterally, no wheezing, rales, rhonchi, good air entry bilaterally  BREAST:  not examined  GI:  abdomen soft, non-tender, non-distended, normal BS, no bruits, no abdominal masses, no palpable masses  RECTAL:  not examined  :  not examined  MSK:   normal muscle tone, no atrophy, no rigidity, no contractions  EXT:  no clubbing, no cyanosis, no edema, no calf pain, swelling or erythema  VASCULAR:  pulses equal and symmetric in the upper and lower extremities  NEURO:  AAOX3, no focal neurological deficits, follows all commands, able to move extremities spontaneously  SKIN:  no ulcers, lesions or rashes    LABS:                            12.8   7.74  )-----------( 180      ( 03 Sep 2019 06:16 )             38.9     09-04    147<H>  |  110<H>  |  17  ----------------------------<  86  3.9   |  28  |  0.90    Ca    8.7      04 Sep 2019 06:34                                              CULTURES:
HPI: 89 y/o male with a PMHx of Alzheimer's, CKD, Dementia, Depression, DM, HTN, HLD, presents [facility] the ED [facility] c/o shortness of breath. Pt was sent to ED for lethargy, generalized weakness, SOB, and productive cough. Pt presents afebrile in the ED. PCP: Dr. Mason. Full HPI is unobtainable due to pt being a poor historian. History obtained from chart as patient unable to given any collateral info.    8/30: multifocal PNA  FS down to 36, better after D10 drip    8/31: no new complaints  FS much better, hypoglycemia resolved; pt eating food  VANESSA resolved    9/1: no complaints  FS elevated; Lantus 10 units at bedtime started    9/2: temp of 100.1 last aaron  mild cough  no more hypoglycemia    9/3: condition same  temp of 100.4 last aaron  hypoglycemia resolved  cont iv cefepime as per ID    PHYSICAL EXAM:    Vital Signs Last 24 Hrs  T(C): 35.8 (03 Sep 2019 15:18), Max: 38 (02 Sep 2019 19:53)  T(F): 96.5 (03 Sep 2019 15:18), Max: 100.4 (02 Sep 2019 19:53)  HR: --  BP: 131/51 (03 Sep 2019 16:00) (117/39 - 134/45)  BP(mean): 70 (03 Sep 2019 16:00) (54 - 70)  RR: --  SpO2: 93% (03 Sep 2019 12:00) (93% - 96%)      Constitutional: Weak appearing  HEENT: Atraumatic, CHELSIE, Normal, No congestion  Respiratory: Breath Sounds normal, no rhonchi/wheeze  Cardiovascular: N S1S2;   Gastrointestinal: Abdomen soft, non tender, Bowel Sounds present  Extremities: No edema, peripheral pulses present  Neurological: AAO x 1, no gross focal motor deficits  Skin: Non cellulitic, no rash, ulcers  Lymph Nodes: No lymphadenopathy noted  Back: No CVA tenderness   Musculoskeletal: non tender  Breasts: Deferred  Genitourinary: deferred  Rectal: Deferred                   Lab Results:  CBC  CBC Full  -  ( 03 Sep 2019 06:16 )  WBC Count : 7.74 K/uL  RBC Count : 4.21 M/uL  Hemoglobin : 12.8 g/dL  Hematocrit : 38.9 %  Platelet Count - Automated : 180 K/uL  Mean Cell Volume : 92.4 fl  Mean Cell Hemoglobin : 30.4 pg  Mean Cell Hemoglobin Concentration : 32.9 gm/dL  Auto Neutrophil # : x  Auto Lymphocyte # : x  Auto Monocyte # : x  Auto Eosinophil # : x  Auto Basophil # : x  Auto Neutrophil % : x  Auto Lymphocyte % : x  Auto Monocyte % : x  Auto Eosinophil % : x  Auto Basophil % : x    .		Differential:	[] Automated		[] Manual  Chemistry                        12.8   7.74  )-----------( 180      ( 03 Sep 2019 06:16 )             38.9     09-03    149<H>  |  110<H>  |  20  ----------------------------<  109<H>  3.8   |  29  |  1.00    Ca    8.6      03 Sep 2019 06:16                  MICROBIOLOGY/CULTURES:  Culture Results:   No growth at 5 days. (08-29 @ 11:35)  Culture Results:   No growth at 5 days. (08-29 @ 11:31)  Culture Results:   No growth (08-29 @ 11:31)                MEDICATIONS  (STANDING):  aspirin  chewable 81 milliGRAM(s) Oral daily  cefepime  Injectable. 1000 milliGRAM(s) IV Push every 12 hours  citalopram 10 milliGRAM(s) Oral daily  dextrose 5%. 1000 milliLiter(s) (50 mL/Hr) IV Continuous <Continuous>  dextrose 50% Injectable 12.5 Gram(s) IV Push once  docusate sodium 100 milliGRAM(s) Oral daily  heparin  Injectable 5000 Unit(s) SubCutaneous every 12 hours  insulin glargine Injectable (LANTUS) 10 Unit(s) SubCutaneous at bedtime  insulin lispro (HumaLOG) corrective regimen sliding scale   SubCutaneous three times a day before meals  pantoprazole    Tablet 40 milliGRAM(s) Oral before breakfast  simvastatin 10 milliGRAM(s) Oral at bedtime
HPI: 89 y/o male with a PMHx of Alzheimer's, CKD, Dementia, Depression, DM, HTN, HLD, [facility] ED [facility] c/o shortness of breath. Pt was sent to ED for lethargy, generalized weakness, SOB, and productive cough. Pt presents afebrile in the ED. PCP: Dr. Mason. Full HPI is unobtainable due to pt being a poor historian. History obtained from chart as patient unable to given any collateral info.    8/30: multifocal PNA  FS down to 36, better after D10 drip    8/31: no new complaints  FS much better, hypoglycemia resolved; pt eating food  VANESSA resolved    9/1: no complaints  FS elevated; Lantus 10 units at bedtime started    PHYSICAL EXAM:    Vital Signs Last 24 Hrs  T(C): 37.6 (01 Sep 2019 06:00), Max: 38.3 (31 Aug 2019 15:12)  T(F): 99.7 (01 Sep 2019 06:00), Max: 100.9 (31 Aug 2019 15:12)  HR: 47 (01 Sep 2019 07:40) (47 - 67)  BP: 121/47 (01 Sep 2019 07:00) (115/48 - 140/59)  BP(mean): 66 (01 Sep 2019 07:00) (62 - 94)  RR: 20 (01 Sep 2019 07:40) (16 - 25)  SpO2: 97% (01 Sep 2019 07:40) (93% - 97%)      Constitutional: Weak appearing  HEENT: Atraumatic, CHELSIE, Normal, No congestion  Respiratory: Breath Sounds normal, no rhonchi/wheeze  Cardiovascular: N S1S2;   Gastrointestinal: Abdomen soft, non tender, Bowel Sounds present  Extremities: No edema, peripheral pulses present  Neurological: AAO x 1, no gross focal motor deficits  Skin: Non cellulitic, no rash, ulcers  Lymph Nodes: No lymphadenopathy noted  Back: No CVA tenderness   Musculoskeletal: non tender  Breasts: Deferred  Genitourinary: deferred  Rectal: Deferred           MICROBIOLOGY/CULTURES:  Culture Results:   No growth to date. (08-29 @ 11:35)  Culture Results:   No growth to date. (08-29 @ 11:31)  Culture Results:   No growth (08-29 @ 11:31)      MEDICATIONS  (STANDING):  aspirin  chewable 81 milliGRAM(s) Oral daily  cefepime  Injectable. 1000 milliGRAM(s) IV Push every 12 hours  citalopram 10 milliGRAM(s) Oral daily  dextrose 5%. 1000 milliLiter(s) (50 mL/Hr) IV Continuous <Continuous>  dextrose 50% Injectable 12.5 Gram(s) IV Push once  docusate sodium 100 milliGRAM(s) Oral daily  heparin  Injectable 5000 Unit(s) SubCutaneous every 12 hours  insulin glargine Injectable (LANTUS) 10 Unit(s) SubCutaneous at bedtime  insulin lispro (HumaLOG) corrective regimen sliding scale   SubCutaneous three times a day before meals  pantoprazole    Tablet 40 milliGRAM(s) Oral before breakfast  simvastatin 10 milliGRAM(s) Oral at bedtime
HPI: 89 y/o male with a PMHx of Alzheimer's, CKD, Dementia, Depression, DM, HTN, HLD, presents to the ED BIBA from [facility] c/o shortness of breath. Pt was sent to ED for lethargy, generalized weakness, SOB, and productive cough. Pt presents afebrile in the ED. PCP: Dr. Mason. Full HPI is unobtainable due to pt being a poor historian. History obtained from chart as patient unable to given any collateral info.    8/30: multifocal PNA  FS down to 36, better after D10 drip    8/31: no new complaints  FS much better, hypoglycemia resolved; pt eating food  VANESSA resolved    PHYSICAL EXAM:    Vital Signs Last 24 Hrs  T(C): 37.9 (31 Aug 2019 06:00), Max: 37.9 (31 Aug 2019 06:00)  T(F): 100.3 (31 Aug 2019 06:00), Max: 100.3 (31 Aug 2019 06:00)  HR: 62 (31 Aug 2019 12:00) (52 - 72)  BP: 122/43 (31 Aug 2019 12:00) (90/38 - 142/58)  BP(mean): 62 (31 Aug 2019 12:00) (50 - 95)  RR: 25 (31 Aug 2019 12:00) (16 - 25)  SpO2: 95% (31 Aug 2019 08:00) (90% - 98%)      Constitutional: Weak and ill appearing  HEENT: Atraumatic, CHELSIE, Normal, No congestion  Respiratory: Breath Sounds normal, no rhonchi/wheeze  Cardiovascular: N S1S2;   Gastrointestinal: Abdomen soft, non tender, Bowel Sounds present  Extremities: No edema, peripheral pulses present  Neurological: AAO x 0, no gross focal motor deficits  Skin: Non cellulitic, no rash, ulcers  Lymph Nodes: No lymphadenopathy noted  Back: No CVA tenderness   Musculoskeletal: non tender  Breasts: Deferred  Genitourinary: deferred  Rectal: Deferred            Lab Results:  CBC  CBC Full  -  ( 30 Aug 2019 06:40 )  WBC Count : 9.50 K/uL  RBC Count : 4.11 M/uL  Hemoglobin : 12.4 g/dL  Hematocrit : 37.9 %  Platelet Count - Automated : 143 K/uL  Mean Cell Volume : 92.2 fl  Mean Cell Hemoglobin : 30.2 pg  Mean Cell Hemoglobin Concentration : 32.7 gm/dL  Auto Neutrophil # : 7.70 K/uL  Auto Lymphocyte # : 1.33 K/uL  Auto Monocyte # : 0.38 K/uL  Auto Eosinophil # : 0.00 K/uL  Auto Basophil # : 0.10 K/uL  Auto Neutrophil % : 74.0 %  Auto Lymphocyte % : 14.0 %  Auto Monocyte % : 4.0 %  Auto Eosinophil % : 0.0 %  Auto Basophil % : 1.0 %    .		Differential:	[] Automated		[] Manual  Chemistry                        12.4   9.50  )-----------( 143      ( 30 Aug 2019 06:40 )             37.9     08-30    142  |  111<H>  |  31<H>  ----------------------------<  35<LL>  3.6   |  25  |  1.09    Ca    8.1<L>      30 Aug 2019 06:40                  MICROBIOLOGY/CULTURES:  Culture Results:   No growth to date. (08-29 @ 11:35)  Culture Results:   No growth to date. (08-29 @ 11:31)  Culture Results:   No growth (08-29 @ 11:31)      RADIOLOGY RESULTS:        MEDICATIONS  (STANDING):  aspirin  chewable 81 milliGRAM(s) Oral daily  cefepime  Injectable. 1000 milliGRAM(s) IV Push every 12 hours  citalopram 10 milliGRAM(s) Oral daily  dextrose 10%. 1000 milliLiter(s) (35 mL/Hr) IV Continuous <Continuous>  dextrose 5%. 1000 milliLiter(s) (50 mL/Hr) IV Continuous <Continuous>  dextrose 50% Injectable 12.5 Gram(s) IV Push once  docusate sodium 100 milliGRAM(s) Oral daily  heparin  Injectable 5000 Unit(s) SubCutaneous every 12 hours  insulin lispro (HumaLOG) corrective regimen sliding scale   SubCutaneous three times a day before meals  pantoprazole    Tablet 40 milliGRAM(s) Oral before breakfast  simvastatin 10 milliGRAM(s) Oral at bedtime
HPI: 89 y/o male with a PMHx of Alzheimer's, CKD, Dementia, Depression, DM, HTN, HLD, presents to the ED BIBA from [facility] c/o shortness of breath. Pt was sent to ED for lethargy, generalized weakness, SOB, and productive cough. Pt presents afebrile in the ED. PCP: Dr. Mason. Full HPI is unobtainable due to pt being a poor historian. History obtained from chart as patient unable to given any collateral info.    8/30: multifocal PNA  FS down to 36, better after D10 drip    8/31: no new complaints  FS much better, hypoglycemia resolved; pt eating food  VANESSA resolved    9/1: no complaints  FS elevated; Lantus 10 units at bedtime started    9/2: temp of 100.1 last aaron  mild cough  no more hypoglycemia    PHYSICAL EXAM:    Vital Signs Last 24 Hrs  T(C): 37.4 (01 Sep 2019 23:11), Max: 37.8 (01 Sep 2019 19:23)  T(F): 99.4 (01 Sep 2019 23:11), Max: 100.1 (01 Sep 2019 19:23)  HR: 66 (02 Sep 2019 06:05) (49 - 66)  BP: 146/48 (02 Sep 2019 06:05) (105/47 - 146/48)  BP(mean): 73 (02 Sep 2019 06:05) (62 - 73)  RR: 18 (02 Sep 2019 06:05) (17 - 22)  SpO2: 94% (02 Sep 2019 06:05) (87% - 94%)      Constitutional: Weak appearing  HEENT: Atraumatic, CHELSIE, Normal, No congestion  Respiratory: Breath Sounds normal, no rhonchi/wheeze  Cardiovascular: N S1S2;   Gastrointestinal: Abdomen soft, non tender, Bowel Sounds present  Extremities: No edema, peripheral pulses present  Neurological: AAO x 1, no gross focal motor deficits  Skin: Non cellulitic, no rash, ulcers  Lymph Nodes: No lymphadenopathy noted  Back: No CVA tenderness   Musculoskeletal: non tender  Breasts: Deferred  Genitourinary: deferred  Rectal: Deferred         Lab Results:  CBC    .		Differential:	[] Automated		[] Manual  Chemistry    09-02    146<H>  |  113<H>  |  20  ----------------------------<  135<H>  3.6   |  27  |  1.03    Ca    8.5      02 Sep 2019 06:16                  MICROBIOLOGY/CULTURES:  Culture Results:   No growth to date. (08-29 @ 11:35)  Culture Results:   No growth to date. (08-29 @ 11:31)  Culture Results:   No growth (08-29 @ 11:31)      RADIOLOGY RESULTS:        MEDICATIONS  (STANDING):  aspirin  chewable 81 milliGRAM(s) Oral daily  cefepime  Injectable. 1000 milliGRAM(s) IV Push every 12 hours  citalopram 10 milliGRAM(s) Oral daily  dextrose 5%. 1000 milliLiter(s) (50 mL/Hr) IV Continuous <Continuous>  dextrose 50% Injectable 12.5 Gram(s) IV Push once  docusate sodium 100 milliGRAM(s) Oral daily  heparin  Injectable 5000 Unit(s) SubCutaneous every 12 hours  insulin glargine Injectable (LANTUS) 10 Unit(s) SubCutaneous at bedtime  insulin lispro (HumaLOG) corrective regimen sliding scale   SubCutaneous three times a day before meals  pantoprazole    Tablet 40 milliGRAM(s) Oral before breakfast  simvastatin 10 milliGRAM(s) Oral at bedtime
Patient is a 89y old  Male who presents with a chief complaint of cough (30 Aug 2019 07:06)    Date of service: 08-31-19 @ 14:44    Patient lying in bed; eating lunch  Afebrile, still with cough        ROS unable to obtain secondary to patient medical condition     MEDICATIONS  (STANDING):  aspirin  chewable 81 milliGRAM(s) Oral daily  cefepime  Injectable. 1000 milliGRAM(s) IV Push every 12 hours  citalopram 10 milliGRAM(s) Oral daily  dextrose 5%. 1000 milliLiter(s) (50 mL/Hr) IV Continuous <Continuous>  dextrose 50% Injectable 12.5 Gram(s) IV Push once  docusate sodium 100 milliGRAM(s) Oral daily  heparin  Injectable 5000 Unit(s) SubCutaneous every 12 hours  insulin lispro (HumaLOG) corrective regimen sliding scale   SubCutaneous three times a day before meals  pantoprazole    Tablet 40 milliGRAM(s) Oral before breakfast  simvastatin 10 milliGRAM(s) Oral at bedtime    MEDICATIONS  (PRN):  acetaminophen   Tablet .. 650 milliGRAM(s) Oral every 6 hours PRN Mild Pain (1 - 3)  aluminum hydroxide/magnesium hydroxide/simethicone Suspension 30 milliLiter(s) Oral every 4 hours PRN Dyspepsia  dextrose 40% Gel 15 Gram(s) Oral once PRN Blood Glucose LESS THAN 70 milliGRAM(s)/deciliter  glucagon  Injectable 1 milliGRAM(s) IntraMuscular once PRN Glucose LESS THAN 70 milligrams/deciliter  ondansetron Injectable 4 milliGRAM(s) IV Push every 6 hours PRN Nausea  senna 2 Tablet(s) Oral at bedtime PRN Constipation      Vital Signs Last 24 Hrs  T(C): 37.9 (31 Aug 2019 06:00), Max: 37.9 (31 Aug 2019 06:00)  T(F): 100.3 (31 Aug 2019 06:00), Max: 100.3 (31 Aug 2019 06:00)  HR: 67 (31 Aug 2019 14:00) (52 - 72)  BP: 129/81 (31 Aug 2019 14:00) (90/38 - 142/58)  BP(mean): 86 (31 Aug 2019 14:00) (50 - 95)  RR: 23 (31 Aug 2019 14:00) (16 - 25)  SpO2: 95% (31 Aug 2019 08:00) (90% - 98%)    Physical Exam:    PE:    Constitutional: frail looking  HEENT: NC/AT, EOMI, PERRLA, conjunctivae clear; ears and nose atraumatic; pharynx clear  Neck: supple; thyroid not palpable  Back: no tenderness  Respiratory: respiratory effort normal; bilateral rhonchi  Cardiovascular: S1S2 regular, no murmurs  Abdomen: soft, not tender, not distended, positive BS; no liver or spleen organomegaly  Genitourinary: no suprapubic tenderness  Musculoskeletal: no muscle tenderness, no joint swelling or tenderness  Neurological/ Psychiatric: AxOx3, judgement and insight normal;  moving all extremities  Skin: no rashes; no palpable lesions    Labs: all available labs reviewed                      Labs:                        12.4   9.50  )-----------( 143      ( 30 Aug 2019 06:40 )             37.9     08-30    142  |  111<H>  |  31<H>  ----------------------------<  35<LL>  3.6   |  25  |  1.09    Ca    8.1<L>      30 Aug 2019 06:40             Cultures:       Culture - Blood (collected 08-29-19 @ 11:35)  Source: .Blood Blood-Peripheral  Preliminary Report (08-30-19 @ 16:01):    No growth to date.    Culture - Urine (collected 08-29-19 @ 11:31)  Source: .Urine Clean Catch (Midstream)  Final Report (08-30-19 @ 12:59):    No growth    Culture - Blood (collected 08-29-19 @ 11:31)  Source: .Blood Blood-Peripheral  Preliminary Report (08-30-19 @ 16:01):    No growth to date.            < from: CT Angio Chest PE Protocol w/ IV Cont (08.29.19 @ 13:39) >    EXAM:  CTA CHEST PE PROTOCOL (W)AW IC                            PROCEDURE DATE:  08/29/2019          INTERPRETATION:  CTA chest dated 8/29/2019.    COMPARISON: 2/15/2019.    CLINICAL INFORMATION: Hypoxia.    TECHNIQUE: Contiguous axial 1.25 mm slice thickness images of the chest   were obtained after intravenous contrast administration utilizing PE   protocol.    90 mls of Omnipaque 350 was administered intravenously without   complication and 10 mls were discarded.    FINDINGS:    The airway is patent showing normal caliber and contour.  Consolidation of the posterior medial segment of the left lower lobe and   some infiltrates in the right lung base and some infiltrates and/or   atelectatic changes in the lingula.  There are  small bilateral pleural effusions. Findings are likely   indicative of multifocal pneumonia, for which clinical correlation is   recommended.    The mediastinum great vessels , ectasia of the ascending thoracic aorta,   4 cm in the AP diameter.There are no pulmonary arterial filling defects   to suggest pulmonary embolism.    Development of some  subcarinal lymphadenopathy, the summation of 2.1 x 2   x 2.5 cm in diameters.     The heart is mildly enlarged.    A limited evaluation of the upper abdomen, a vague 8 mm hypodensity in   the right lobe of the liver is unchanged. A 2.5 mm hypodense lesion in   the left lobe is unchanged. A 9 mm hypodense lesion in segment 5. These   lesions cannot be accurately characterized due to their early arterial   phase of contrast injection.    The bones , dorsal kyphosis. Increased  density of vertebral body of T12   and T7 and some hypertrophic changes in the thoracic spine. Correlation   with a  bone scan is recommended.    IMPRESSION:  No evidence of pulmonary embolism.   Multifocal pneumonia is the consideration and clinical correlation.   Associated small bilateral pleural effusions.  Other findings as above.      < end of copied text >      Radiology: all available radiological tests reviewed    Advanced directives addressed: full resuscitation

## 2019-09-04 NOTE — PROGRESS NOTE ADULT - ASSESSMENT
89 y/o male with a PMHx of Alzheimer's, CKD, Dementia, Depression, DM, HTN, HLD, presents to the ED BIBA from [facility] c/o shortness of breath. Pt admitted with           1) Multifocal PNA /HCAp/GNR suspected + Sepsis with bandemia 2/2 to mulitfocal pna + Enterovirus/Rhinovirus:  admit  cont ABX as per ID  f/u blood cx    2) Hypoglycemia: resolved with D10 drip  monitor FS    3) VANESSA/ATN 2/2 to sepsis: iv fluids    4) DM: hold lantus  ISS with coverage    5) HTN/hyperlipidemia/Dementia:  home meds  supportive care    6) Mild abn trops: no MI  cardio consult appreciated    7) DVT PPX: heparin s/q
89 y/o male with a PMHx of Alzheimer's Dementia, Depression, DM, HTN, HLD, admitted on 8/29 from snf for evaluation of lethargy, weakness, shortness of breath and productive cough; the patient had fever to 100 upon admission and mild elevated lactate. History per medical record as patient is unable to provide history.    1. Patient admitted with pneumonia which will treat as health care associated pneumonia given that patient admitted from snf  - patient at risk for gram negative rods and other resistant bacteria   - oxygen and nebs as needed   - iv hydration and supportive care   - serial cbc and monitor temperature   - reviewed prior medical records to evaluate for resistant or atypical pathogens   - day #4 cefepime  - tolerating antibiotics without rashes or side effects   - will hold on further vancomycin for now given renal function  2. other issues: per medicine
89 y/o male with a PMHx of Alzheimer's Dementia, Depression, DM, HTN, HLD, admitted on 8/29 from snf for evaluation of lethargy, weakness, shortness of breath and productive cough; the patient had fever to 100 upon admission and mild elevated lactate. History per medical record as patient is unable to provide history.    1. Patient admitted with pneumonia which will treat as health care associated pneumonia given that patient admitted from snf  - patient at risk for gram negative rods and other resistant bacteria   - oxygen and nebs as needed   - iv hydration and supportive care   - serial cbc and monitor temperature   - reviewed prior medical records to evaluate for resistant or atypical pathogens   - day #5 cefepime  - tolerating antibiotics without rashes or side effects   - will hold on further vancomycin for now given renal function  2. other issues: per medicine
89 y/o male with a PMHx of Alzheimer's Dementia, Depression, DM, HTN, HLD, admitted on 8/29 from snf for evaluation of lethargy, weakness, shortness of breath and productive cough; the patient had fever to 100 upon admission and mild elevated lactate. History per medical record as patient is unable to provide history.    1. Patient admitted with pneumonia which will treat as health care associated pneumonia given that patient admitted from snf  - patient at risk for gram negative rods and other resistant bacteria   - oxygen and nebs as needed   - iv hydration and supportive care   - serial cbc and monitor temperature   - reviewed prior medical records to evaluate for resistant or atypical pathogens   - day #2 cefepime  - tolerating antibiotics without rashes or side effects   - will hold on further vancomycin for now given renal function  2. other issues: per medicine
89 y/o male with a PMHx of Alzheimer's, CKD, Dementia, Depression, DM, HTN, HLD, presents to the ED BIBA from [facility] c/o shortness of breath. Pt admitted with       1) Multifocal PNA /HCAp/GNR suspected + Sepsis with bandemia 2/2 to mulitfocal pna + Enterovirus/Rhinovirus:  admit  cont ABX as per ID  Neg blood cx so far    2) Hypoglycemia: resolved with D10 drip, stop now  monitor FS  cont po diet    3) VANESSA/ATN 2/2 to sepsis: iv fluids  resolved    4) DM: hold lantus  ISS with coverage    5) HTN/hyperlipidemia/Dementia:  home meds  supportive care    6) Mild abn trops: no MI  cardio consult appreciated    7) DVT PPX: heparin s/q
89 y/o male with a PMHx of Alzheimer's, CKD, Dementia, Depression, DM, HTN, HLD, presents to the ED BIBA from [facility] c/o shortness of breath. Pt admitted with       1) Multifocal PNA /HCAp/GNR suspected + Sepsis with bandemia 2/2 to mulitfocal pna + Enterovirus/Rhinovirus:  admit  cont ABX as per ID  Neg blood cx so far    2) Hypoglycemia: resolved with D10 drip, stop now  monitor FS  cont po diet    3) VANESSA/ATN 2/2 to sepsis: iv fluids  resolved    4) DM: restart Lantus at 10 units q hs  ISS with coverage    5) HTN/hyperlipidemia/Dementia:  home meds  supportive care    6) Mild abn trops: no MI  cardio consult appreciated    7) DVT PPX: heparin s/q    poc discussed with pt, team
89 y/o male with a PMHx of Alzheimer's, CKD, Dementia, Depression, DM, HTN, HLD, presents to the ED BIBA from [facility] c/o shortness of breath. Pt admitted with     1) Multifocal PNA /HCAP/GNR suspected + Sepsis with bandemia 2/2 to mulitfocal pna + Enterovirus/Rhinovirus:  cont Cefepime as per ID  Neg blood cx so far    2) Hypoglycemia: resolved with D10 drip, stopped; no more hypoglycemic events  monitor FS; elevated, cont lantus, ISS  cont po diet    3) VANESSA/ATN 2/2 to sepsis: iv fluids  resolved    4) DM: restarted Lantus at 10 units q hs from 9/1  ISS with coverage    5) HTN/hyperlipidemia/Dementia:  home meds  supportive care    6) Mild abn trops: no MI  cardio consult appreciated    7) DVT PPX: heparin s/q      poc discussed with pttessa,  team
89 y/o male with a PMHx of Alzheimer's, CKD, Dementia, Depression, DM, HTN, HLD, presents to the ED BIBA from [facility] c/o shortness of breath. Pt admitted with       1) Multifocal PNA /HCAP/GNR suspected + Sepsis with bandemia 2/2 to mulitfocal pna + Enterovirus/Rhinovirus:  admit  cont Cefepime as per ID  Neg blood cx so far    2) Hypoglycemia: resolved with D10 drip, stopped; no more hypoglycemic events  monitor FS; elevated, cont lantus, ISS  cont po diet    3) VANESSA/ATN 2/2 to sepsis: iv fluids  resolved    4) DM: restarted Lantus at 10 units q hs from 9/1  ISS with coverage    5) HTN/hyperlipidemia/Dementia:  home meds  supportive care    6) Mild abn trops: no MI  cardio consult appreciated    7) DVT PPX: heparin s/q    8) Hypernatremia 149: recheck in am    poc discussed with pt, tessa,  team
89 y/o male with a PMHx of Alzheimer's, CKD, Dementia, Depression, DM, HTN, HLD, presents to the ED BIBA from [facility] c/o shortness of breath. Pt admitted with       1) Multifocal PNA /HCAp/GNR suspected + Sepsis with bandemia 2/2 to mulitfocal pna + Enterovirus/Rhinovirus:  admit  cont Cefepime as per ID  Neg blood cx so far    2) Hypoglycemia: resolved with D10 drip, stopped; no more hypoglycemic events  monitor FS  cont po diet    3) VANESSA/ATN 2/2 to sepsis: iv fluids  resolved    4) DM: restarted Lantus at 10 units q hs from 9/1  ISS with coverage    5) HTN/hyperlipidemia/Dementia:  home meds  supportive care    6) Mild abn trops: no MI  cardio consult appreciated    7) DVT PPX: heparin s/q    poc discussed with pttessa,  team

## 2019-09-05 ENCOUNTER — TRANSCRIPTION ENCOUNTER (OUTPATIENT)
Age: 84
End: 2019-09-05

## 2019-09-05 VITALS — SYSTOLIC BLOOD PRESSURE: 115 MMHG | DIASTOLIC BLOOD PRESSURE: 45 MMHG

## 2019-09-05 LAB
ANION GAP SERPL CALC-SCNC: 9 MMOL/L — SIGNIFICANT CHANGE UP (ref 5–17)
BUN SERPL-MCNC: 18 MG/DL — SIGNIFICANT CHANGE UP (ref 7–23)
CALCIUM SERPL-MCNC: 8.7 MG/DL — SIGNIFICANT CHANGE UP (ref 8.5–10.1)
CHLORIDE SERPL-SCNC: 108 MMOL/L — SIGNIFICANT CHANGE UP (ref 96–108)
CO2 SERPL-SCNC: 26 MMOL/L — SIGNIFICANT CHANGE UP (ref 22–31)
CREAT SERPL-MCNC: 0.94 MG/DL — SIGNIFICANT CHANGE UP (ref 0.5–1.3)
GLUCOSE SERPL-MCNC: 75 MG/DL — SIGNIFICANT CHANGE UP (ref 70–99)
HCT VFR BLD CALC: 39.8 % — SIGNIFICANT CHANGE UP (ref 39–50)
HGB BLD-MCNC: 12.9 G/DL — LOW (ref 13–17)
MCHC RBC-ENTMCNC: 29.9 PG — SIGNIFICANT CHANGE UP (ref 27–34)
MCHC RBC-ENTMCNC: 32.4 GM/DL — SIGNIFICANT CHANGE UP (ref 32–36)
MCV RBC AUTO: 92.3 FL — SIGNIFICANT CHANGE UP (ref 80–100)
PLATELET # BLD AUTO: 209 K/UL — SIGNIFICANT CHANGE UP (ref 150–400)
POTASSIUM SERPL-MCNC: 3.8 MMOL/L — SIGNIFICANT CHANGE UP (ref 3.5–5.3)
POTASSIUM SERPL-SCNC: 3.8 MMOL/L — SIGNIFICANT CHANGE UP (ref 3.5–5.3)
RBC # BLD: 4.31 M/UL — SIGNIFICANT CHANGE UP (ref 4.2–5.8)
RBC # FLD: 13.9 % — SIGNIFICANT CHANGE UP (ref 10.3–14.5)
SODIUM SERPL-SCNC: 143 MMOL/L — SIGNIFICANT CHANGE UP (ref 135–145)
WBC # BLD: 9.54 K/UL — SIGNIFICANT CHANGE UP (ref 3.8–10.5)
WBC # FLD AUTO: 9.54 K/UL — SIGNIFICANT CHANGE UP (ref 3.8–10.5)

## 2019-09-05 RX ORDER — INSULIN GLARGINE 100 [IU]/ML
20 INJECTION, SOLUTION SUBCUTANEOUS
Qty: 0 | Refills: 0 | DISCHARGE

## 2019-09-05 RX ORDER — INSULIN GLARGINE 100 [IU]/ML
10 INJECTION, SOLUTION SUBCUTANEOUS
Qty: 0 | Refills: 0 | DISCHARGE
Start: 2019-09-05

## 2019-09-05 RX ORDER — CEFUROXIME AXETIL 250 MG
1 TABLET ORAL
Qty: 6 | Refills: 0
Start: 2019-09-05 | End: 2019-09-07

## 2019-09-05 RX ADMIN — Medication 2: at 11:50

## 2019-09-05 RX ADMIN — Medication 100 MILLIGRAM(S): at 11:50

## 2019-09-05 RX ADMIN — CITALOPRAM 10 MILLIGRAM(S): 10 TABLET, FILM COATED ORAL at 11:50

## 2019-09-05 RX ADMIN — Medication 81 MILLIGRAM(S): at 11:50

## 2019-09-05 RX ADMIN — HEPARIN SODIUM 5000 UNIT(S): 5000 INJECTION INTRAVENOUS; SUBCUTANEOUS at 05:30

## 2019-09-05 RX ADMIN — PANTOPRAZOLE SODIUM 40 MILLIGRAM(S): 20 TABLET, DELAYED RELEASE ORAL at 05:30

## 2019-09-05 RX ADMIN — CEFEPIME 1000 MILLIGRAM(S): 1 INJECTION, POWDER, FOR SOLUTION INTRAMUSCULAR; INTRAVENOUS at 05:30

## 2019-09-05 NOTE — DISCHARGE NOTE PROVIDER - NSDCCPCAREPLAN_GEN_ALL_CORE_FT
PRINCIPAL DISCHARGE DIAGNOSIS  Diagnosis: Pneumonia of both lungs due to infectious organism, unspecified part of lung  Assessment and Plan of Treatment:

## 2019-09-05 NOTE — DISCHARGE NOTE PROVIDER - CARE PROVIDER_API CALL
Parul Rhodes)  Cardiovascular Disease; Internal Medicine  [address] [place] [address]  Phone: [phone]  Fax: [phone]  Follow Up Time:     Nataliya Christianson ()  Infectious Disease; Internal Medicine  [address], [place] [address]  Phone: [phone]  Fax: [phone]  Follow Up Time:

## 2019-09-05 NOTE — DISCHARGE NOTE PROVIDER - HOSPITAL COURSE
89 y/o male with a PMHx of Alzheimer's, CKD, Dementia, Depression, DM, HTN, HLD, presents to the ED BIBA from [facility] c/o shortness of breath. Pt was sent to ED for lethargy, generalized weakness, SOB, and productive cough. Pt presents afebrile in the ED. PCP: Dr. Mason. Full HPI is unobtainable due to pt being a poor historian. History obtained from chart as patient unable to given any collateral info.        9/4/19 pt seen and examined, no distress.         9/5/19 pt seen and examined, feels well, transferred out of CICU. stable.             PHYSICAL EXAM:        Constitutional: NAD, awake and alert, well-developed    HEENT: PERR, EOMI, Normal Hearing, MMM    Neck: Soft and supple, No LAD, No JVD    Respiratory: Breath sounds are clear bilaterally, No wheezing, rales or rhonchi    Cardiovascular: S1 and S2, regular rate and rhythm, no Murmurs, gallops or rubs    Gastrointestinal: Bowel Sounds present, soft, nontender, nondistended, no guarding, no rebound    Extremities: No peripheral edema    Vascular: 2+ peripheral pulses    Neurological: A/O x 3, no focal deficits    Musculoskeletal: 5/5 strength b/l upper and lower extremities    Skin: No rashes        89 y/o male with a PMHx of Alzheimer's, CKD, Dementia, Depression, DM, HTN, HLD, presents to the ED BIBA from [facility] c/o shortness of breath. Pt admitted with         1) Multifocal PNA /HCAP/GNR suspected + Sepsis with bandemia 2/2 to mulitfocal pna + Enterovirus/Rhinovirus:    cont Cefepime as per ID    Neg blood cx so far    discussed with ID and will transition to ceftin        2) Hypoglycemia: resolved with D10 drip, stopped; no more hypoglycemic events    monitor FS; elevated, cont lantus, ISS    cont po diet        3) VANESSA/ATN 2/2 to sepsis: iv fluids    resolved        4) DM: restarted Lantus at 10 units q hs from 9/1    ISS with coverage        5) HTN/hyperlipidemia/Dementia:    home meds    supportive care        6) Mild abn trops: no MI    cardio consult appreciated        7) DVT PPX: heparin s/q            poc discussed with pt, niece,  team

## 2019-09-05 NOTE — DISCHARGE NOTE NURSING/CASE MANAGEMENT/SOCIAL WORK - PATIENT PORTAL LINK FT
You can access the FollowMyHealth Patient Portal offered by [facility] by registering at the following website: [email or web address]. By joining [employer]’s FollowMyHealth portal, you will also be able to view your health information using other applications (apps) compatible with our system.

## 2019-09-10 DIAGNOSIS — I24.8 OTHER FORMS OF ACUTE ISCHEMIC HEART DISEASE: ICD-10-CM

## 2019-09-10 DIAGNOSIS — N18.3 CHRONIC KIDNEY DISEASE, STAGE 3 (MODERATE): ICD-10-CM

## 2019-09-10 DIAGNOSIS — A41.9 SEPSIS, UNSPECIFIED ORGANISM: ICD-10-CM

## 2019-09-10 DIAGNOSIS — F02.80 DEMENTIA IN OTHER DISEASES CLASSIFIED ELSEWHERE, UNSPECIFIED SEVERITY, WITHOUT BEHAVIORAL DISTURBANCE, PSYCHOTIC DISTURBANCE, MOOD DISTURBANCE, AND ANXIETY: ICD-10-CM

## 2019-09-10 DIAGNOSIS — E11.649 TYPE 2 DIABETES MELLITUS WITH HYPOGLYCEMIA WITHOUT COMA: ICD-10-CM

## 2019-09-10 DIAGNOSIS — E78.5 HYPERLIPIDEMIA, UNSPECIFIED: ICD-10-CM

## 2019-09-10 DIAGNOSIS — F32.9 MAJOR DEPRESSIVE DISORDER, SINGLE EPISODE, UNSPECIFIED: ICD-10-CM

## 2019-09-10 DIAGNOSIS — B97.10 UNSPECIFIED ENTEROVIRUS AS THE CAUSE OF DISEASES CLASSIFIED ELSEWHERE: ICD-10-CM

## 2019-09-10 DIAGNOSIS — G30.9 ALZHEIMER'S DISEASE, UNSPECIFIED: ICD-10-CM

## 2019-09-10 DIAGNOSIS — E87.0 HYPEROSMOLALITY AND HYPERNATREMIA: ICD-10-CM

## 2019-09-10 DIAGNOSIS — I12.9 HYPERTENSIVE CHRONIC KIDNEY DISEASE WITH STAGE 1 THROUGH STAGE 4 CHRONIC KIDNEY DISEASE, OR UNSPECIFIED CHRONIC KIDNEY DISEASE: ICD-10-CM

## 2019-09-10 DIAGNOSIS — N17.0 ACUTE KIDNEY FAILURE WITH TUBULAR NECROSIS: ICD-10-CM

## 2019-09-10 DIAGNOSIS — E11.22 TYPE 2 DIABETES MELLITUS WITH DIABETIC CHRONIC KIDNEY DISEASE: ICD-10-CM

## 2019-09-10 DIAGNOSIS — J15.6 PNEUMONIA DUE TO OTHER GRAM-NEGATIVE BACTERIA: ICD-10-CM

## 2019-09-10 DIAGNOSIS — B97.89 OTHER VIRAL AGENTS AS THE CAUSE OF DISEASES CLASSIFIED ELSEWHERE: ICD-10-CM

## 2019-10-16 ENCOUNTER — EMERGENCY (EMERGENCY)
Facility: HOSPITAL | Age: 84
LOS: 0 days | Discharge: HOME | End: 2019-10-17
Attending: EMERGENCY MEDICINE
Payer: MEDICARE

## 2019-10-16 VITALS
SYSTOLIC BLOOD PRESSURE: 122 MMHG | DIASTOLIC BLOOD PRESSURE: 83 MMHG | WEIGHT: 154.98 LBS | OXYGEN SATURATION: 96 % | RESPIRATION RATE: 16 BRPM | TEMPERATURE: 98 F | HEIGHT: 71 IN | HEART RATE: 72 BPM

## 2019-10-16 VITALS
TEMPERATURE: 98 F | SYSTOLIC BLOOD PRESSURE: 136 MMHG | RESPIRATION RATE: 16 BRPM | HEART RATE: 66 BPM | OXYGEN SATURATION: 95 % | DIASTOLIC BLOOD PRESSURE: 66 MMHG

## 2019-10-16 DIAGNOSIS — E11.9 TYPE 2 DIABETES MELLITUS WITHOUT COMPLICATIONS: ICD-10-CM

## 2019-10-16 DIAGNOSIS — S09.90XA UNSPECIFIED INJURY OF HEAD, INITIAL ENCOUNTER: ICD-10-CM

## 2019-10-16 DIAGNOSIS — Z79.82 LONG TERM (CURRENT) USE OF ASPIRIN: ICD-10-CM

## 2019-10-16 DIAGNOSIS — I12.9 HYPERTENSIVE CHRONIC KIDNEY DISEASE WITH STAGE 1 THROUGH STAGE 4 CHRONIC KIDNEY DISEASE, OR UNSPECIFIED CHRONIC KIDNEY DISEASE: ICD-10-CM

## 2019-10-16 DIAGNOSIS — F02.80 DEMENTIA IN OTHER DISEASES CLASSIFIED ELSEWHERE, UNSPECIFIED SEVERITY, WITHOUT BEHAVIORAL DISTURBANCE, PSYCHOTIC DISTURBANCE, MOOD DISTURBANCE, AND ANXIETY: ICD-10-CM

## 2019-10-16 DIAGNOSIS — N18.4 CHRONIC KIDNEY DISEASE, STAGE 4 (SEVERE): ICD-10-CM

## 2019-10-16 DIAGNOSIS — Z79.899 OTHER LONG TERM (CURRENT) DRUG THERAPY: ICD-10-CM
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
## 2019-10-16 DIAGNOSIS — Z90.89 ACQUIRED ABSENCE OF OTHER ORGANS: Chronic | ICD-10-CM

## 2019-10-16 DIAGNOSIS — Z79.4 LONG TERM (CURRENT) USE OF INSULIN: ICD-10-CM
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
## 2019-10-16 DIAGNOSIS — E16.2 HYPOGLYCEMIA, UNSPECIFIED: ICD-10-CM
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
## 2019-10-16 DIAGNOSIS — G30.9 ALZHEIMER'S DISEASE, UNSPECIFIED: ICD-10-CM

## 2019-10-16 LAB
ALBUMIN SERPL ELPH-MCNC: 3.7 G/DL — SIGNIFICANT CHANGE UP (ref 3.3–5)
ALP SERPL-CCNC: 301 U/L — HIGH (ref 40–120)
ALT FLD-CCNC: 17 U/L — SIGNIFICANT CHANGE UP (ref 12–78)
ANION GAP SERPL CALC-SCNC: 7 MMOL/L — SIGNIFICANT CHANGE UP (ref 5–17)
APPEARANCE UR: CLEAR — SIGNIFICANT CHANGE UP
APTT BLD: 35.3 SEC — SIGNIFICANT CHANGE UP (ref 27.5–36.3)
AST SERPL-CCNC: 21 U/L — SIGNIFICANT CHANGE UP (ref 15–37)
BASOPHILS # BLD AUTO: 0.06 K/UL — SIGNIFICANT CHANGE UP (ref 0–0.2)
BASOPHILS NFR BLD AUTO: 0.8 % — SIGNIFICANT CHANGE UP (ref 0–2)
BILIRUB SERPL-MCNC: 0.4 MG/DL — SIGNIFICANT CHANGE UP (ref 0.2–1.2)
BILIRUB UR-MCNC: NEGATIVE — SIGNIFICANT CHANGE UP
BUN SERPL-MCNC: 26 MG/DL — HIGH (ref 7–23)
CALCIUM SERPL-MCNC: 9.1 MG/DL — SIGNIFICANT CHANGE UP (ref 8.5–10.1)
CHLORIDE SERPL-SCNC: 108 MMOL/L — SIGNIFICANT CHANGE UP (ref 96–108)
CK SERPL-CCNC: 73 U/L — SIGNIFICANT CHANGE UP (ref 26–308)
CO2 SERPL-SCNC: 28 MMOL/L — SIGNIFICANT CHANGE UP (ref 22–31)
COLOR SPEC: YELLOW — SIGNIFICANT CHANGE UP
CREAT SERPL-MCNC: 0.89 MG/DL — SIGNIFICANT CHANGE UP (ref 0.5–1.3)
DIFF PNL FLD: ABNORMAL
EOSINOPHIL # BLD AUTO: 0.17 K/UL — SIGNIFICANT CHANGE UP (ref 0–0.5)
EOSINOPHIL NFR BLD AUTO: 2.2 % — SIGNIFICANT CHANGE UP (ref 0–6)
ETHANOL SERPL-MCNC: <10 MG/DL — SIGNIFICANT CHANGE UP (ref 0–10)
GLUCOSE SERPL-MCNC: 60 MG/DL — LOW (ref 70–99)
GLUCOSE UR QL: NEGATIVE MG/DL — SIGNIFICANT CHANGE UP
HCT VFR BLD CALC: 44.7 % — SIGNIFICANT CHANGE UP (ref 39–50)
HGB BLD-MCNC: 14.5 G/DL — SIGNIFICANT CHANGE UP (ref 13–17)
IMM GRANULOCYTES NFR BLD AUTO: 0.4 % — SIGNIFICANT CHANGE UP (ref 0–1.5)
INR BLD: 1.03 RATIO — SIGNIFICANT CHANGE UP (ref 0.88–1.16)
KETONES UR-MCNC: NEGATIVE — SIGNIFICANT CHANGE UP
LACTATE SERPL-SCNC: 1.4 MMOL/L — SIGNIFICANT CHANGE UP (ref 0.7–2)
LEUKOCYTE ESTERASE UR-ACNC: NEGATIVE — SIGNIFICANT CHANGE UP
LIDOCAIN IGE QN: 168 U/L — SIGNIFICANT CHANGE UP (ref 73–393)
LYMPHOCYTES # BLD AUTO: 0.79 K/UL — LOW (ref 1–3.3)
LYMPHOCYTES # BLD AUTO: 10.4 % — LOW (ref 13–44)
MCHC RBC-ENTMCNC: 30 PG — SIGNIFICANT CHANGE UP (ref 27–34)
MCHC RBC-ENTMCNC: 32.4 GM/DL — SIGNIFICANT CHANGE UP (ref 32–36)
MCV RBC AUTO: 92.5 FL — SIGNIFICANT CHANGE UP (ref 80–100)
MONOCYTES # BLD AUTO: 0.7 K/UL — SIGNIFICANT CHANGE UP (ref 0–0.9)
MONOCYTES NFR BLD AUTO: 9.2 % — SIGNIFICANT CHANGE UP (ref 2–14)
NEUTROPHILS # BLD AUTO: 5.87 K/UL — SIGNIFICANT CHANGE UP (ref 1.8–7.4)
NEUTROPHILS NFR BLD AUTO: 77 % — SIGNIFICANT CHANGE UP (ref 43–77)
NITRITE UR-MCNC: NEGATIVE — SIGNIFICANT CHANGE UP
PH UR: 6.5 — SIGNIFICANT CHANGE UP (ref 5–8)
PLATELET # BLD AUTO: 186 K/UL — SIGNIFICANT CHANGE UP (ref 150–400)
POTASSIUM SERPL-MCNC: 3.9 MMOL/L — SIGNIFICANT CHANGE UP (ref 3.5–5.3)
POTASSIUM SERPL-SCNC: 3.9 MMOL/L — SIGNIFICANT CHANGE UP (ref 3.5–5.3)
PROT SERPL-MCNC: 7 GM/DL — SIGNIFICANT CHANGE UP (ref 6–8.3)
PROT UR-MCNC: 15 MG/DL
PROTHROM AB SERPL-ACNC: 11.5 SEC — SIGNIFICANT CHANGE UP (ref 10–12.9)
RBC # BLD: 4.83 M/UL — SIGNIFICANT CHANGE UP (ref 4.2–5.8)
RBC # FLD: 14.6 % — HIGH (ref 10.3–14.5)
SODIUM SERPL-SCNC: 143 MMOL/L — SIGNIFICANT CHANGE UP (ref 135–145)
SP GR SPEC: 1.01 — SIGNIFICANT CHANGE UP (ref 1.01–1.02)
TROPONIN I SERPL-MCNC: <0.015 NG/ML — SIGNIFICANT CHANGE UP (ref 0.01–0.04)
UROBILINOGEN FLD QL: NEGATIVE MG/DL — SIGNIFICANT CHANGE UP
WBC # BLD: 7.62 K/UL — SIGNIFICANT CHANGE UP (ref 3.8–10.5)
WBC # FLD AUTO: 7.62 K/UL — SIGNIFICANT CHANGE UP (ref 3.8–10.5)

## 2019-10-16 PROCEDURE — 71045 X-RAY EXAM CHEST 1 VIEW: CPT | Mod: 26

## 2019-10-16 PROCEDURE — 85610 PROTHROMBIN TIME: CPT

## 2019-10-16 PROCEDURE — 80053 COMPREHEN METABOLIC PANEL: CPT

## 2019-10-16 PROCEDURE — 82550 ASSAY OF CK (CPK): CPT

## 2019-10-16 PROCEDURE — 71045 X-RAY EXAM CHEST 1 VIEW: CPT

## 2019-10-16 PROCEDURE — 80307 DRUG TEST PRSMV CHEM ANLYZR: CPT

## 2019-10-16 PROCEDURE — 83605 ASSAY OF LACTIC ACID: CPT

## 2019-10-16 PROCEDURE — 72170 X-RAY EXAM OF PELVIS: CPT | Mod: 26

## 2019-10-16 PROCEDURE — 81001 URINALYSIS AUTO W/SCOPE: CPT

## 2019-10-16 PROCEDURE — 72125 CT NECK SPINE W/O DYE: CPT | Mod: 26

## 2019-10-16 PROCEDURE — 99284 EMERGENCY DEPT VISIT MOD MDM: CPT | Mod: 25

## 2019-10-16 PROCEDURE — 84484 ASSAY OF TROPONIN QUANT: CPT

## 2019-10-16 PROCEDURE — 85730 THROMBOPLASTIN TIME PARTIAL: CPT

## 2019-10-16 PROCEDURE — 36415 COLL VENOUS BLD VENIPUNCTURE: CPT

## 2019-10-16 PROCEDURE — 72170 X-RAY EXAM OF PELVIS: CPT

## 2019-10-16 PROCEDURE — 85025 COMPLETE CBC W/AUTO DIFF WBC: CPT

## 2019-10-16 PROCEDURE — 70450 CT HEAD/BRAIN W/O DYE: CPT | Mod: 26

## 2019-10-16 PROCEDURE — 72125 CT NECK SPINE W/O DYE: CPT

## 2019-10-16 PROCEDURE — 82962 GLUCOSE BLOOD TEST: CPT

## 2019-10-16 PROCEDURE — 99284 EMERGENCY DEPT VISIT MOD MDM: CPT

## 2019-10-16 PROCEDURE — 96374 THER/PROPH/DIAG INJ IV PUSH: CPT | Mod: 59

## 2019-10-16 PROCEDURE — 70450 CT HEAD/BRAIN W/O DYE: CPT

## 2019-10-16 PROCEDURE — 83690 ASSAY OF LIPASE: CPT

## 2019-10-16 RX ORDER — DEXTROSE 50 % IN WATER 50 %
50 SYRINGE (ML) INTRAVENOUS ONCE
Refills: 0 | Status: COMPLETED | OUTPATIENT
Start: 2019-10-16 | End: 2019-10-16

## 2019-10-16 RX ORDER — DEXTROSE 50 % IN WATER 50 %
140 SYRINGE (ML) INTRAVENOUS ONCE
Refills: 0 | Status: DISCONTINUED | OUTPATIENT
Start: 2019-10-16 | End: 2019-10-16

## 2019-10-16 RX ADMIN — Medication 50 MILLILITER(S): at 20:31

## 2019-10-16 NOTE — ED PROVIDER NOTE - NSFOLLOWUPINSTRUCTIONS_ED_ALL_ED_FT
Blood sugar was low.  Watch sugars carefully especially when giving Insulin  Head CT negative see results  Return to ED for any further concerns     Hypoglycemia    Hypoglycemia occurs when the glucose (sugar) level in your blood is too low. Symptoms include confusion, weakness, or fainting. You may even appear to be having a stroke. Take medications exactly as prescribed by your health care professional. Maintain a healthy lifestyle and follow up with your primary care physician.    SEEK IMMEDIATE MEDICAL CARE IF YOU HAVE ANY OF THE FOLLOWING SYMPTOMS: weakness, fainting, change in mental status, nausea or vomiting, fruity smell to your breath, or any signs of dehydration.

## 2019-10-16 NOTE — ED ADULT NURSE NOTE - CHIEF COMPLAINT QUOTE
Pt. to the ED BIBA from NH C/O Fall from standing height witnessed by Aide. As per EMS, Glucose of 71 at scene and PO Chocolate was given. BGM 54 at arrival. + head injury + Baby Aspirin- As per EMS, Fall was witnessed but unknown LOC. Hx. of Dementia  GSC 14- TA to ED Called by EMS RN

## 2019-10-16 NOTE — ED PROVIDER NOTE - OBJECTIVE STATEMENT
89yo male with h/o dementia, sent to ED s/p fall.  Pt was a witnessed fall, on aspirin.  Patient offers no history and complains "I want out of here"

## 2019-10-16 NOTE — ED ADULT NURSE NOTE - CHPI ED NUR SYMPTOMS NEG
no bleeding/no abrasion/no weakness/no deformity/no tingling/no vomiting/no loss of consciousness/no numbness/no fever

## 2019-10-16 NOTE — ED ADULT NURSE NOTE - OBJECTIVE STATEMENT
pt brought in by EMS for evaluation of fall, pt was witnessed fall at nursing home from standing height, per EMS BMG on scene was 71, PO chocolate given and dextrose administered PO in ambulance, on arrival pt BGM 43, truama alert called, pt is a poor historian, per EMS pt with head trauma and on baby ASA, pt has no complaints at this time

## 2019-10-16 NOTE — ED PROVIDER NOTE - PATIENT PORTAL LINK FT
You can access the FollowMyHealth Patient Portal offered by [facility] by registering at the following website: [email or web address]. By joining [employer]’s FollowMyHealth portal, you will also be able to view your health information using other applications (apps) compatible with our system.

## 2019-10-16 NOTE — ED ADULT NURSE NOTE - NSIMPLEMENTINTERV_GEN_ALL_ED
Implemented All Fall with Harm Risk Interventions:  [place] to call system. Call bell, personal items and telephone within reach. Instruct patient to call for assistance. Room bathroom lighting operational. Non-slip footwear when patient is off stretcher. Physically safe environment: no spills, clutter or unnecessary equipment. Stretcher in lowest position, wheels locked, appropriate side rails in place. Provide visual cue, wrist band, yellow gown, etc. Monitor gait and stability. Monitor for mental status changes and reorient to person, place, and time. Review medications for side effects contributing to fall risk. Reinforce activity limits and safety measures with patient and family. Provide visual clues: red socks.

## 2019-10-17 NOTE — ED ADULT NURSE REASSESSMENT NOTE - NS ED NURSE REASSESS COMMENT FT1
pt BGM after eating increased to 119, pt in NAD, pt has no complaints at this time, pt in NAD, pt waiting ambulance to nursing home, safety and comfort measures maintained, will continue to monitor

## 2020-01-15 NOTE — ED PROVIDER NOTE - NS_EDPROVIDERDISPOUSERTYPE_ED_A_ED
Spoke with anesthesiologist [facility] regarding pt nausea and vomiting. Pt. Out of parameters for Zofran by 45 minutes. Ok to give early. Scribe Attestation (For Scribes USE Only)... Attending Attestation (For Attendings USE Only).../Scribe Attestation (For Scribes USE Only)...

## 2021-01-17 NOTE — ED PROVIDER NOTE - HISTORY ATTESTATION, MLM
Pt c/o LLE swelling;  NKT;  Pt seen last week and had doppler that was negative;   Pt sts she can not get appt with pcp I have reviewed and confirmed nurses' notes...

## 2021-04-29 NOTE — ED ADULT TRIAGE NOTE - MODE OF ARRIVAL
Date last seen: 3-31-21  Pt was advised to rtc in 3 months   Date of next visit: no future appt scheduled     Medication Requested: lisdexamfetamine (Vyvanse) 30 MG capsule #30 capsules      BP Readings from Last 1 Encounters:   03/31/21 100/64     No recent routine labwork  Last labs done 3-2018  Routing to PCP for approval   
[place] PDMP was checked.  Last refill of Vyvanse given 4/2/21 for a quantity of 30.  Red flags none.  Rx approved.  
[facility]

## 2021-07-13 NOTE — ED ADULT TRIAGE NOTE - CCCP TRG CHIEF CMPLNT
Pharmacy faxed request for medication clarification.  Preferred pharmacy set up and verified.    Med Name: Prednisone    Message on fax:  Please verify directions, directions written do not match quantity provided. Thank you!        back pain/injury/fall

## 2022-04-13 NOTE — ED PROVIDER NOTE - NS ED MD DISPO SPECIAL CONSIDERATION1
Pt A&OX4, pt denies dizziness/lightheadedness, n/v. Discharge paperwork and follow-up discussed with pt, pt verbally understands them. Pt discharged ambulatory with steady gait - no distress noted.
None

## 2022-12-06 NOTE — ED ADULT NURSE NOTE - NSFALLRSKASSISTTYPE_ED_ALL_ED
Detail Level: Detailed Gentle Skin Care Counseling: I recommended use a gentle skin cleanser when washing the skin. I also recommended application of a moisturizer twice daily. Products with fragrances, preservatives and dyes should be avoided. Walking/Standing/Toileting

## 2023-01-10 NOTE — DISCHARGE NOTE ADULT - NSTOBACCONEVERSMOKERY/N_GEN_A
Yes, Non-Core measure site... [Follow-Up - Clinic] : a clinic follow-up of [Hyperlipidemia] : hyperlipidemia [Hypertension] : hypertension [FreeTextEntry1] : review results

## 2024-11-19 NOTE — H&P ADULT - HISTORY OF PRESENT ILLNESS
· HPI Objective Statement: 89 y/o male with a PMHx of Alzheimer's, CKD, Dementia, Depression, DM, HTN, HLD, presents to the ED BIBA from [facility] c/o shortness of breath. Pt was sent to ED for lethargy, generalized weakness, SOB, and productive cough. Pt presents afebrile in the ED. PCP: Dr. Mason. Full HPI is unobtainable due to pt being a poor historian. History obtained from chart as patient unable to given any collateral info  PT had low grade temp in ED of 99.9. Found to have multifocal pna on CTA with no evidence of PE. 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 [place] 2.3 s/p IVF. Repeat 1.9. Given vanco and cefepime in ED. 2 sets troponin in ED 0.04->0.04. Scr 1.4 with WBC count 13 with 20% bandemia	    PAST MEDICAL/SURGICAL/FAMILY/SOCIAL HISTORY:    Past Medical History:  CKD (chronic kidney disease), stage IV    Dementia  Alzheimer's  Depression    Diabetes  Insulin dependent  HTN (hypertension)    Hyperlipidemia.     Past Surgical History:  History of tonsillectomy.     Tobacco Usage:  · Tobacco Usage	Unknown if ever smoked
Stable.